# Patient Record
Sex: FEMALE | Race: WHITE | NOT HISPANIC OR LATINO | URBAN - METROPOLITAN AREA
[De-identification: names, ages, dates, MRNs, and addresses within clinical notes are randomized per-mention and may not be internally consistent; named-entity substitution may affect disease eponyms.]

---

## 2023-06-05 ENCOUNTER — TRANSCRIBE ORDERS (OUTPATIENT)
Dept: ADMINISTRATIVE | Facility: HOSPITAL | Age: 58
End: 2023-06-05
Payer: COMMERCIAL

## 2023-06-05 DIAGNOSIS — Z12.31 VISIT FOR SCREENING MAMMOGRAM: Primary | ICD-10-CM

## 2023-08-25 ENCOUNTER — HOSPITAL ENCOUNTER (OUTPATIENT)
Dept: MAMMOGRAPHY | Facility: HOSPITAL | Age: 58
Discharge: HOME | End: 2023-08-25
Payer: COMMERCIAL

## 2023-08-25 ENCOUNTER — APPOINTMENT (OUTPATIENT)
Dept: OTHER | Facility: HOSPITAL | Age: 58
End: 2023-08-25
Payer: COMMERCIAL

## 2023-08-25 DIAGNOSIS — Z12.31 VISIT FOR SCREENING MAMMOGRAM: ICD-10-CM

## 2023-08-25 PROCEDURE — 77063 BREAST TOMOSYNTHESIS BI: CPT

## 2023-08-25 PROCEDURE — 77067 SCR MAMMO BI INCL CAD: CPT

## 2023-09-29 ENCOUNTER — TRANSCRIBE ORDERS (OUTPATIENT)
Dept: MAMMOGRAPHY | Facility: HOSPITAL | Age: 58
End: 2023-09-29
Payer: COMMERCIAL

## 2023-09-29 ENCOUNTER — HOSPITAL ENCOUNTER (OUTPATIENT)
Dept: ULTRASOUND IMAGING | Facility: HOSPITAL | Age: 58
Discharge: HOME | End: 2023-09-29
Payer: COMMERCIAL

## 2023-09-29 ENCOUNTER — HOSPITAL ENCOUNTER (OUTPATIENT)
Dept: MAMMOGRAPHY | Facility: HOSPITAL | Age: 58
Discharge: HOME | End: 2023-09-29
Payer: COMMERCIAL

## 2023-09-29 DIAGNOSIS — R92.8 ABNORMAL MAMMOGRAM: ICD-10-CM

## 2023-09-29 PROCEDURE — 76642 ULTRASOUND BREAST LIMITED: CPT

## 2023-09-29 PROCEDURE — 77066 DX MAMMO INCL CAD BI: CPT

## 2023-09-29 PROCEDURE — G0279 TOMOSYNTHESIS, MAMMO: HCPCS

## 2023-11-16 ENCOUNTER — HOSPITAL ENCOUNTER (OUTPATIENT)
Dept: MAMMOGRAPHY | Facility: HOSPITAL | Age: 58
Discharge: HOME | End: 2023-11-16
Payer: COMMERCIAL

## 2023-11-16 DIAGNOSIS — R92.8 ABNORMAL MAMMOGRAM: ICD-10-CM

## 2023-11-16 PROCEDURE — 25010000002 LIDOCAINE 1 % SOLUTION: Performed by: RADIOLOGY

## 2023-11-16 PROCEDURE — C1819 TISSUE LOCALIZATION-EXCISION: HCPCS

## 2023-11-16 RX ORDER — LIDOCAINE HYDROCHLORIDE AND EPINEPHRINE 10; 10 MG/ML; UG/ML
10 INJECTION, SOLUTION INFILTRATION; PERINEURAL ONCE
Status: COMPLETED | OUTPATIENT
Start: 2023-11-16 | End: 2023-11-16

## 2023-11-16 RX ORDER — LIDOCAINE HYDROCHLORIDE 10 MG/ML
13 INJECTION, SOLUTION INFILTRATION; PERINEURAL ONCE
Status: COMPLETED | OUTPATIENT
Start: 2023-11-16 | End: 2023-11-16

## 2023-11-16 RX ADMIN — LIDOCAINE HYDROCHLORIDE,EPINEPHRINE BITARTRATE 10 ML: 10; .01 INJECTION, SOLUTION INFILTRATION; PERINEURAL at 13:45

## 2023-11-16 RX ADMIN — LIDOCAINE HYDROCHLORIDE,EPINEPHRINE BITARTRATE 10 ML: 10; .01 INJECTION, SOLUTION INFILTRATION; PERINEURAL at 14:27

## 2023-11-16 RX ADMIN — LIDOCAINE HYDROCHLORIDE 6 ML: 10 INJECTION, SOLUTION INFILTRATION; PERINEURAL at 13:45

## 2023-11-16 RX ADMIN — LIDOCAINE HYDROCHLORIDE 6 ML: 10 INJECTION, SOLUTION INFILTRATION; PERINEURAL at 14:31

## 2023-11-16 NOTE — PROGRESS NOTES
Alert and orientated. Denies discomfort, no active bleeding, steri-strips not visualized, gauze dressing intact bilaterally. Cold packs given. Verbalizes and demonstrates understanding of post-care instructions, written copy given.

## 2023-11-20 ENCOUNTER — TELEPHONE (OUTPATIENT)
Dept: MAMMOGRAPHY | Facility: HOSPITAL | Age: 58
End: 2023-11-20
Payer: COMMERCIAL

## 2023-11-20 LAB
CYTO UR: NORMAL
CYTO UR: NORMAL
LAB AP CASE REPORT: NORMAL
LAB AP CASE REPORT: NORMAL
LAB AP CLINICAL INFORMATION: NORMAL
LAB AP CLINICAL INFORMATION: NORMAL
LAB AP DIAGNOSIS COMMENT: NORMAL
LAB AP DIAGNOSIS COMMENT: NORMAL
PATH REPORT.FINAL DX SPEC: NORMAL
PATH REPORT.FINAL DX SPEC: NORMAL
PATH REPORT.GROSS SPEC: NORMAL
PATH REPORT.GROSS SPEC: NORMAL

## 2023-11-21 ENCOUNTER — TELEPHONE (OUTPATIENT)
Dept: MAMMOGRAPHY | Facility: HOSPITAL | Age: 58
End: 2023-11-21
Payer: COMMERCIAL

## 2023-11-21 NOTE — TELEPHONE ENCOUNTER
Patient notified of surgical consult appointment with Dr. Jorgensen on 12/7/23 @ 1500. Patient given office contact & location information. Told to bring photo ID, list of prescription & OTC medications, insurance information. May be accompanied by family member or friend for support. Encouraged pt to call back or contact surgeon's office with further questions. Patient verbalized understanding.

## 2023-12-19 ENCOUNTER — TRANSCRIBE ORDERS (OUTPATIENT)
Dept: ADMINISTRATIVE | Facility: HOSPITAL | Age: 58
End: 2023-12-19
Payer: COMMERCIAL

## 2023-12-19 DIAGNOSIS — R92.8 ABNORMAL MAMMOGRAM: Primary | ICD-10-CM

## 2023-12-29 ENCOUNTER — PRE-ADMISSION TESTING (OUTPATIENT)
Dept: PREADMISSION TESTING | Facility: HOSPITAL | Age: 58
End: 2023-12-29
Payer: COMMERCIAL

## 2023-12-29 LAB
ANION GAP SERPL CALCULATED.3IONS-SCNC: 9 MMOL/L (ref 5–15)
BUN SERPL-MCNC: 12 MG/DL (ref 6–20)
BUN/CREAT SERPL: 16.9 (ref 7–25)
CALCIUM SPEC-SCNC: 9.5 MG/DL (ref 8.6–10.5)
CHLORIDE SERPL-SCNC: 103 MMOL/L (ref 98–107)
CO2 SERPL-SCNC: 25 MMOL/L (ref 22–29)
CREAT SERPL-MCNC: 0.71 MG/DL (ref 0.57–1)
DEPRECATED RDW RBC AUTO: 41.2 FL (ref 37–54)
EGFRCR SERPLBLD CKD-EPI 2021: 98.1 ML/MIN/1.73
ERYTHROCYTE [DISTWIDTH] IN BLOOD BY AUTOMATED COUNT: 12.8 % (ref 12.3–15.4)
GLUCOSE SERPL-MCNC: 114 MG/DL (ref 65–99)
HCT VFR BLD AUTO: 41.1 % (ref 34–46.6)
HGB BLD-MCNC: 13.4 G/DL (ref 12–15.9)
MCH RBC QN AUTO: 28.5 PG (ref 26.6–33)
MCHC RBC AUTO-ENTMCNC: 32.6 G/DL (ref 31.5–35.7)
MCV RBC AUTO: 87.3 FL (ref 79–97)
PLATELET # BLD AUTO: 233 10*3/MM3 (ref 140–450)
PMV BLD AUTO: 10.5 FL (ref 6–12)
POTASSIUM SERPL-SCNC: 4.2 MMOL/L (ref 3.5–5.2)
RBC # BLD AUTO: 4.71 10*6/MM3 (ref 3.77–5.28)
SODIUM SERPL-SCNC: 137 MMOL/L (ref 136–145)
WBC NRBC COR # BLD AUTO: 5.77 10*3/MM3 (ref 3.4–10.8)

## 2023-12-29 PROCEDURE — 80048 BASIC METABOLIC PNL TOTAL CA: CPT

## 2023-12-29 PROCEDURE — 36415 COLL VENOUS BLD VENIPUNCTURE: CPT

## 2023-12-29 PROCEDURE — 93005 ELECTROCARDIOGRAM TRACING: CPT

## 2023-12-29 PROCEDURE — 85027 COMPLETE CBC AUTOMATED: CPT

## 2023-12-30 LAB
QT INTERVAL: 390 MS
QTC INTERVAL: 414 MS

## 2024-01-09 ENCOUNTER — LAB REQUISITION (OUTPATIENT)
Dept: LAB | Facility: HOSPITAL | Age: 59
End: 2024-01-09
Payer: COMMERCIAL

## 2024-01-09 ENCOUNTER — HOSPITAL ENCOUNTER (OUTPATIENT)
Dept: MAMMOGRAPHY | Facility: HOSPITAL | Age: 59
Discharge: HOME | End: 2024-01-09
Payer: COMMERCIAL

## 2024-01-09 DIAGNOSIS — R92.8 ABNORMAL MAMMOGRAM: ICD-10-CM

## 2024-01-09 DIAGNOSIS — R92.0 MAMMOGRAPHIC MICROCALCIFICATION FOUND ON DIAGNOSTIC IMAGING OF BREAST: ICD-10-CM

## 2024-01-09 DIAGNOSIS — K81.1 CHOLECYSTITIS, CHRONIC: Primary | ICD-10-CM

## 2024-01-09 PROCEDURE — C1819 TISSUE LOCALIZATION-EXCISION: HCPCS

## 2024-01-09 PROCEDURE — 76098 X-RAY EXAM SURGICAL SPECIMEN: CPT

## 2024-01-09 PROCEDURE — 25010000002 LIDOCAINE 1 % SOLUTION: Performed by: RADIOLOGY

## 2024-01-09 RX ORDER — TRAMADOL HYDROCHLORIDE 50 MG/1
50 TABLET ORAL EVERY 6 HOURS PRN
Qty: 15 TABLET | Refills: 0 | Status: SHIPPED | OUTPATIENT
Start: 2024-01-09

## 2024-01-09 RX ORDER — LIDOCAINE HYDROCHLORIDE 10 MG/ML
5 INJECTION, SOLUTION INFILTRATION; PERINEURAL ONCE
Status: COMPLETED | OUTPATIENT
Start: 2024-01-09 | End: 2024-01-09

## 2024-01-09 RX ADMIN — Medication 2 ML: at 08:10

## 2024-01-11 LAB — REF LAB TEST METHOD: NORMAL

## 2024-02-14 ENCOUNTER — TRANSCRIBE ORDERS (OUTPATIENT)
Dept: ADMINISTRATIVE | Facility: HOSPITAL | Age: 59
End: 2024-02-14
Payer: COMMERCIAL

## 2024-02-14 DIAGNOSIS — R92.8 ABNORMAL MAMMOGRAM: Primary | ICD-10-CM

## 2024-07-15 ENCOUNTER — HOSPITAL ENCOUNTER (OUTPATIENT)
Dept: MAMMOGRAPHY | Facility: HOSPITAL | Age: 59
Discharge: HOME | End: 2024-07-15
Admitting: OBSTETRICS & GYNECOLOGY
Payer: COMMERCIAL

## 2024-07-15 DIAGNOSIS — R92.8 ABNORMAL MAMMOGRAM: ICD-10-CM

## 2024-07-15 PROCEDURE — 77066 DX MAMMO INCL CAD BI: CPT

## 2024-07-15 PROCEDURE — G0279 TOMOSYNTHESIS, MAMMO: HCPCS

## 2024-07-16 ENCOUNTER — TRANSCRIBE ORDERS (OUTPATIENT)
Dept: ADMINISTRATIVE | Facility: HOSPITAL | Age: 59
End: 2024-07-16
Payer: COMMERCIAL

## 2024-07-16 DIAGNOSIS — R92.8 ABNORMAL MAMMOGRAM: Primary | ICD-10-CM

## 2024-07-29 ENCOUNTER — HOSPITAL ENCOUNTER (OUTPATIENT)
Facility: HOSPITAL | Age: 59
Discharge: HOME | End: 2024-07-29
Payer: COMMERCIAL

## 2024-07-29 DIAGNOSIS — R92.8 ABNORMAL MAMMOGRAM: ICD-10-CM

## 2024-07-29 PROCEDURE — 88360 TUMOR IMMUNOHISTOCHEM/MANUAL: CPT | Performed by: RADIOLOGY

## 2024-07-29 PROCEDURE — 88341 IMHCHEM/IMCYTCHM EA ADD ANTB: CPT | Performed by: RADIOLOGY

## 2024-07-29 PROCEDURE — 88305 TISSUE EXAM BY PATHOLOGIST: CPT | Performed by: RADIOLOGY

## 2024-07-29 PROCEDURE — 25010000002 LIDOCAINE 1 % SOLUTION: Performed by: RADIOLOGY

## 2024-07-29 PROCEDURE — C1819 TISSUE LOCALIZATION-EXCISION: HCPCS

## 2024-07-29 RX ORDER — LIDOCAINE HYDROCHLORIDE 10 MG/ML
10 INJECTION, SOLUTION INFILTRATION; PERINEURAL ONCE
Status: COMPLETED | OUTPATIENT
Start: 2024-07-29 | End: 2024-07-29

## 2024-07-29 RX ORDER — LIDOCAINE HYDROCHLORIDE AND EPINEPHRINE 10; 10 MG/ML; UG/ML
10 INJECTION, SOLUTION INFILTRATION; PERINEURAL ONCE
Status: COMPLETED | OUTPATIENT
Start: 2024-07-29 | End: 2024-07-29

## 2024-07-29 RX ADMIN — LIDOCAINE HYDROCHLORIDE,EPINEPHRINE BITARTRATE 10 ML: 10; .01 INJECTION, SOLUTION INFILTRATION; PERINEURAL at 09:29

## 2024-07-29 RX ADMIN — LIDOCAINE HYDROCHLORIDE,EPINEPHRINE BITARTRATE 10 ML: 10; .01 INJECTION, SOLUTION INFILTRATION; PERINEURAL at 08:56

## 2024-07-29 RX ADMIN — LIDOCAINE HYDROCHLORIDE 6 ML: 10 INJECTION, SOLUTION INFILTRATION; PERINEURAL at 08:56

## 2024-07-29 RX ADMIN — LIDOCAINE HYDROCHLORIDE 9 ML: 10 INJECTION, SOLUTION INFILTRATION; PERINEURAL at 09:28

## 2024-07-29 NOTE — PROGRESS NOTES
Alert and oriented. Denies discomfort, no active bleeding, steri-strips not visualized, gauze dressing intact both sites.  Cold packs given.  Verbalizes and demonstrates understanding of post-care instructions, written copy given. Questions answered, support given.

## 2024-07-31 LAB
CYTO UR: NORMAL
LAB AP CASE REPORT: NORMAL
LAB AP CLINICAL INFORMATION: NORMAL
PATH REPORT.FINAL DX SPEC: NORMAL
PATH REPORT.GROSS SPEC: NORMAL

## 2024-08-02 ENCOUNTER — TELEPHONE (OUTPATIENT)
Dept: MAMMOGRAPHY | Facility: HOSPITAL | Age: 59
End: 2024-08-02
Payer: COMMERCIAL

## 2024-08-02 LAB
CYTO UR: NORMAL
LAB AP CASE REPORT: NORMAL
LAB AP CLINICAL INFORMATION: NORMAL
LAB AP DIAGNOSIS COMMENT: NORMAL
LAB AP SPECIAL STAINS: NORMAL
PATH REPORT.FINAL DX SPEC: NORMAL
PATH REPORT.GROSS SPEC: NORMAL

## 2024-08-02 NOTE — TELEPHONE ENCOUNTER
Referring providers office could not be notified of pathology results due to being closed and unable to leave a message.     Patient notified of pathology results and recommendation. She is aware she will be notified by [facility] schedulers with an appointment for a left stereotactic breast biopsy. Verbalizes understanding. Denies discomfort. Denies signs and symptoms of infection.     Patient established with Dr GISELLE Jorgensen. Patient notified of surgical consult appointment on 8/15/24 @ 0915 with arrival time of 0945  [facility] [address] [facility]. Patient given office contact & location information.Told to bring photo ID, list of prescription & OTC medications, insurance information. May be accompanied by family member or friend for support. Patient verbalized understanding.     Reviewed what would be discussed at surgical consult visit, including detailed explanation of pathology report & imaging reports; treatment options & pros/cons, availability of breast nurse navigator. Patient encouraged to contact  [facility] nurses with questions or concerns. Breast cancer information packet offered and accepted. Patient verbalized understanding. Patient information sent to breast cancer nurse navigators for evaluation.

## 2024-08-05 ENCOUNTER — TRANSCRIBE ORDERS (OUTPATIENT)
Dept: ADMINISTRATIVE | Facility: HOSPITAL | Age: 59
End: 2024-08-05
Payer: COMMERCIAL

## 2024-08-05 DIAGNOSIS — R92.8 ABNORMAL MAMMOGRAM: Primary | ICD-10-CM

## 2024-08-08 ENCOUNTER — HOSPITAL ENCOUNTER (OUTPATIENT)
Facility: HOSPITAL | Age: 59
Discharge: HOME | End: 2024-08-08
Payer: COMMERCIAL

## 2024-08-08 DIAGNOSIS — R92.8 ABNORMAL MAMMOGRAM: ICD-10-CM

## 2024-08-08 PROCEDURE — 25010000002 LIDOCAINE 1 % SOLUTION: Performed by: RADIOLOGY

## 2024-08-08 PROCEDURE — 88305 TISSUE EXAM BY PATHOLOGIST: CPT | Performed by: RADIOLOGY

## 2024-08-08 PROCEDURE — C1819 TISSUE LOCALIZATION-EXCISION: HCPCS

## 2024-08-08 RX ORDER — LIDOCAINE HYDROCHLORIDE 10 MG/ML
10 INJECTION, SOLUTION INFILTRATION; PERINEURAL ONCE
Status: COMPLETED | OUTPATIENT
Start: 2024-08-08 | End: 2024-08-08

## 2024-08-08 RX ORDER — LIDOCAINE HYDROCHLORIDE AND EPINEPHRINE 10; 10 MG/ML; UG/ML
10 INJECTION, SOLUTION INFILTRATION; PERINEURAL ONCE
Status: COMPLETED | OUTPATIENT
Start: 2024-08-08 | End: 2024-08-08

## 2024-08-08 RX ADMIN — LIDOCAINE HYDROCHLORIDE,EPINEPHRINE BITARTRATE 10 ML: 10; .01 INJECTION, SOLUTION INFILTRATION; PERINEURAL at 08:34

## 2024-08-08 RX ADMIN — LIDOCAINE HYDROCHLORIDE 15 ML: 10 INJECTION, SOLUTION INFILTRATION; PERINEURAL at 08:50

## 2024-08-09 ENCOUNTER — TELEPHONE (OUTPATIENT)
Dept: MAMMOGRAPHY | Facility: HOSPITAL | Age: 59
End: 2024-08-09
Payer: COMMERCIAL

## 2024-08-09 LAB
CYTO UR: NORMAL
LAB AP CASE REPORT: NORMAL
LAB AP CLINICAL INFORMATION: NORMAL
LAB AP DIAGNOSIS COMMENT: NORMAL
PATH REPORT.FINAL DX SPEC: NORMAL
PATH REPORT.GROSS SPEC: NORMAL

## 2024-08-09 NOTE — TELEPHONE ENCOUNTER
Patient notified of pathology results and recommendations. Verbalizes understanding. Denies discomfort. Denies signs and symptoms of infection.     Patient is aware of previously scheduled surgical consult appointment with Dr. Jorgensen on 8/15/24 @ 2692 [address] [place] [address].

## 2024-08-15 ENCOUNTER — PATIENT OUTREACH (OUTPATIENT)
Dept: ONCOLOGY | Facility: CLINIC | Age: 59
End: 2024-08-15
Payer: COMMERCIAL

## 2024-08-15 DIAGNOSIS — Z17.0 MALIGNANT NEOPLASM OF LEFT BREAST IN FEMALE, ESTROGEN RECEPTOR POSITIVE, UNSPECIFIED SITE OF BREAST: Primary | ICD-10-CM

## 2024-08-15 DIAGNOSIS — C50.912 MALIGNANT NEOPLASM OF LEFT BREAST IN FEMALE, ESTROGEN RECEPTOR POSITIVE, UNSPECIFIED SITE OF BREAST: Primary | ICD-10-CM

## 2024-08-15 NOTE — PROGRESS NOTES
I saw patient with Dr. Germain's and patient's . Dr Germain's reviewed pathology report and surgical/treatment options - the patient will have a breast MRI and then she has opted for Bilateral Mastectomies with Immediate Reconstruction - she asked to see Dr Colton Martin for Reconstruction- Dr. Germain's office will make this referral - I took notes for patient and reviewed educational and supportive materials.

## 2024-08-19 ENCOUNTER — PATIENT OUTREACH (OUTPATIENT)
Dept: ONCOLOGY | Facility: CLINIC | Age: 59
End: 2024-08-19
Payer: COMMERCIAL

## 2024-08-19 NOTE — SIGNIFICANT NOTE
Patient called to ask about appointment with Dr Colton Martin- Dr Germain's office had not yet confirmed an appointment - I called [facility] - Patient has appointment to see Dr Martin 8/21/2024 @ 6220. Directions - address and phone number provided to patient - Crystal at [facility] is aware and will send records.

## 2024-08-20 ENCOUNTER — HOSPITAL ENCOUNTER (OUTPATIENT)
Dept: MRI IMAGING | Facility: HOSPITAL | Age: 59
Discharge: HOME | End: 2024-08-20
Admitting: STUDENT IN AN ORGANIZED HEALTH CARE EDUCATION/TRAINING PROGRAM
Payer: COMMERCIAL

## 2024-08-20 DIAGNOSIS — C50.412 MALIGNANT NEOPLASM OF UPPER-OUTER QUADRANT OF LEFT FEMALE BREAST, UNSPECIFIED ESTROGEN RECEPTOR STATUS: ICD-10-CM

## 2024-08-20 PROCEDURE — 77049 MRI BREAST C-+ W/CAD BI: CPT | Performed by: RADIOLOGY

## 2024-08-20 PROCEDURE — A9577 INJ MULTIHANCE: HCPCS | Performed by: STUDENT IN AN ORGANIZED HEALTH CARE EDUCATION/TRAINING PROGRAM

## 2024-08-20 PROCEDURE — 0 GADOBENATE DIMEGLUMINE 529 MG/ML SOLUTION: Performed by: STUDENT IN AN ORGANIZED HEALTH CARE EDUCATION/TRAINING PROGRAM

## 2024-08-20 PROCEDURE — 77049 MRI BREAST C-+ W/CAD BI: CPT

## 2024-08-20 RX ADMIN — GADOBENATE DIMEGLUMINE 17 ML: 529 INJECTION, SOLUTION INTRAVENOUS at 11:22

## 2024-08-21 ENCOUNTER — TELEPHONE (OUTPATIENT)
Dept: MRI IMAGING | Facility: HOSPITAL | Age: 59
End: 2024-08-21
Payer: COMMERCIAL

## 2024-08-21 NOTE — TELEPHONE ENCOUNTER
Just talked with pt as she has had 3 MRI biopsies recommended from her Breast MRI. Pt is planning double mastectomies with reconstruction.  I left a message for Wanda Cerna at Dr. Jorgensen office to confirm the biopsies will not be needed. Msg sent to Nurse navigators to let them know also.

## 2024-08-22 ENCOUNTER — TRANSCRIBE ORDERS (OUTPATIENT)
Dept: ADMINISTRATIVE | Facility: HOSPITAL | Age: 59
End: 2024-08-22
Payer: COMMERCIAL

## 2024-08-22 DIAGNOSIS — C50.412 MALIGNANT NEOPLASM OF UPPER-OUTER QUADRANT OF LEFT FEMALE BREAST, UNSPECIFIED ESTROGEN RECEPTOR STATUS: Primary | ICD-10-CM

## 2024-08-24 ENCOUNTER — TELEPHONE (OUTPATIENT)
Dept: MRI IMAGING | Facility: HOSPITAL | Age: 59
End: 2024-08-24
Payer: COMMERCIAL

## 2024-08-24 NOTE — TELEPHONE ENCOUNTER
I called pt to schedule her MRI guided Breast biopsy recommended by Dr. Jorgensen. She would be scheduled for Wednesday Aug 28th at 8:00 at [facility]. She did not understand why this was being done. She said no one from [facility] had called explaining why she would need this since she is scheduled for Bilateral Mastectomies. I told her I would send a note to the Nurse Navigator so they could help her contact the surgeons office , or provide an explanation for her.  I have asked that they let us know if she is going to proceed     8/24 ivone

## 2024-08-27 ENCOUNTER — TELEPHONE (OUTPATIENT)
Dept: MRI IMAGING | Facility: HOSPITAL | Age: 59
End: 2024-08-27
Payer: COMMERCIAL

## 2024-08-27 NOTE — TELEPHONE ENCOUNTER
After multiple phone calls patient has called back to schedule the recommended Right sided MRI Breast Biopsy. Scheduled for 8/28/2024 at 8:00 with 7:50 arrival at [facility]. No BT. Procedure described in detail and encouraged to call with any further questions. Patient explains she is scheduled for mastectomies on 9/6/2024.

## 2024-08-28 ENCOUNTER — HOSPITAL ENCOUNTER (OUTPATIENT)
Dept: MAMMOGRAPHY | Facility: HOSPITAL | Age: 59
Discharge: HOME | End: 2024-08-28
Payer: COMMERCIAL

## 2024-08-28 ENCOUNTER — HOSPITAL ENCOUNTER (OUTPATIENT)
Dept: MRI IMAGING | Facility: HOSPITAL | Age: 59
Discharge: HOME | End: 2024-08-28
Payer: COMMERCIAL

## 2024-08-28 DIAGNOSIS — C50.412 MALIGNANT NEOPLASM OF UPPER-OUTER QUADRANT OF LEFT FEMALE BREAST, UNSPECIFIED ESTROGEN RECEPTOR STATUS: ICD-10-CM

## 2024-08-28 PROCEDURE — A9577 INJ MULTIHANCE: HCPCS | Performed by: STUDENT IN AN ORGANIZED HEALTH CARE EDUCATION/TRAINING PROGRAM

## 2024-08-28 PROCEDURE — 0 GADOBENATE DIMEGLUMINE 529 MG/ML SOLUTION: Performed by: STUDENT IN AN ORGANIZED HEALTH CARE EDUCATION/TRAINING PROGRAM

## 2024-08-28 PROCEDURE — C1894 INTRO/SHEATH, NON-LASER: HCPCS

## 2024-08-28 PROCEDURE — 88305 TISSUE EXAM BY PATHOLOGIST: CPT | Performed by: RADIOLOGY

## 2024-08-28 PROCEDURE — 25010000002 LIDOCAINE 1 % SOLUTION: Performed by: RADIOLOGY

## 2024-08-28 PROCEDURE — A4648 IMPLANTABLE TISSUE MARKER: HCPCS

## 2024-08-28 RX ORDER — LIDOCAINE HYDROCHLORIDE 10 MG/ML
3 INJECTION, SOLUTION INFILTRATION; PERINEURAL
Status: COMPLETED | OUTPATIENT
Start: 2024-08-28 | End: 2024-08-28

## 2024-08-28 RX ORDER — LIDOCAINE HYDROCHLORIDE AND EPINEPHRINE 10; 10 MG/ML; UG/ML
10 INJECTION, SOLUTION INFILTRATION; PERINEURAL
Status: COMPLETED | OUTPATIENT
Start: 2024-08-28 | End: 2024-08-28

## 2024-08-28 RX ADMIN — Medication 3 ML: at 09:20

## 2024-08-28 RX ADMIN — LIDOCAINE HYDROCHLORIDE,EPINEPHRINE BITARTRATE 10 ML: 10; .01 INJECTION, SOLUTION INFILTRATION; PERINEURAL at 09:20

## 2024-08-28 RX ADMIN — GADOBENATE DIMEGLUMINE 17 ML: 529 INJECTION, SOLUTION INTRAVENOUS at 09:20

## 2024-08-29 ENCOUNTER — PRE-ADMISSION TESTING (OUTPATIENT)
Dept: PREADMISSION TESTING | Facility: HOSPITAL | Age: 59
End: 2024-08-29
Payer: COMMERCIAL

## 2024-08-29 VITALS — WEIGHT: 185.41 LBS | HEIGHT: 63 IN | BODY MASS INDEX: 32.85 KG/M2

## 2024-08-29 LAB
ALBUMIN SERPL-MCNC: 4.3 G/DL (ref 3.5–5.2)
ALBUMIN/GLOB SERPL: 1.5 G/DL
ALP SERPL-CCNC: 84 U/L (ref 39–117)
ALT SERPL W P-5'-P-CCNC: 34 U/L (ref 1–33)
ANION GAP SERPL CALCULATED.3IONS-SCNC: 10 MMOL/L (ref 5–15)
AST SERPL-CCNC: 25 U/L (ref 1–32)
BILIRUB SERPL-MCNC: 0.5 MG/DL (ref 0–1.2)
BUN SERPL-MCNC: 12 MG/DL (ref 6–20)
BUN/CREAT SERPL: 18.5 (ref 7–25)
CALCIUM SPEC-SCNC: 9.4 MG/DL (ref 8.6–10.5)
CHLORIDE SERPL-SCNC: 104 MMOL/L (ref 98–107)
CO2 SERPL-SCNC: 25 MMOL/L (ref 22–29)
CREAT SERPL-MCNC: 0.65 MG/DL (ref 0.57–1)
CYTO UR: NORMAL
DEPRECATED RDW RBC AUTO: 40 FL (ref 37–54)
EGFRCR SERPLBLD CKD-EPI 2021: 101.6 ML/MIN/1.73
ERYTHROCYTE [DISTWIDTH] IN BLOOD BY AUTOMATED COUNT: 12.4 % (ref 12.3–15.4)
GLOBULIN UR ELPH-MCNC: 2.9 GM/DL
GLUCOSE SERPL-MCNC: 114 MG/DL (ref 65–99)
HBA1C MFR BLD: 5.2 % (ref 4.8–5.6)
HCT VFR BLD AUTO: 41.8 % (ref 34–46.6)
HGB BLD-MCNC: 13.6 G/DL (ref 12–15.9)
LAB AP CASE REPORT: NORMAL
LAB AP CLINICAL INFORMATION: NORMAL
LAB AP DIAGNOSIS COMMENT: NORMAL
MCH RBC QN AUTO: 28.6 PG (ref 26.6–33)
MCHC RBC AUTO-ENTMCNC: 32.5 G/DL (ref 31.5–35.7)
MCV RBC AUTO: 87.8 FL (ref 79–97)
PATH REPORT.FINAL DX SPEC: NORMAL
PATH REPORT.GROSS SPEC: NORMAL
PLATELET # BLD AUTO: 228 10*3/MM3 (ref 140–450)
PMV BLD AUTO: 11 FL (ref 6–12)
POTASSIUM SERPL-SCNC: 4.3 MMOL/L (ref 3.5–5.2)
PROT SERPL-MCNC: 7.2 G/DL (ref 6–8.5)
QT INTERVAL: 390 MS
QTC INTERVAL: 412 MS
RBC # BLD AUTO: 4.76 10*6/MM3 (ref 3.77–5.28)
SODIUM SERPL-SCNC: 139 MMOL/L (ref 136–145)
WBC NRBC COR # BLD AUTO: 5.03 10*3/MM3 (ref 3.4–10.8)

## 2024-08-29 PROCEDURE — 36415 COLL VENOUS BLD VENIPUNCTURE: CPT

## 2024-08-29 PROCEDURE — 80053 COMPREHEN METABOLIC PANEL: CPT

## 2024-08-29 PROCEDURE — 83036 HEMOGLOBIN GLYCOSYLATED A1C: CPT

## 2024-08-29 PROCEDURE — 85027 COMPLETE CBC AUTOMATED: CPT

## 2024-08-29 PROCEDURE — 93005 ELECTROCARDIOGRAM TRACING: CPT

## 2024-08-29 RX ORDER — LISINOPRIL 10 MG/1
10 TABLET ORAL DAILY
COMMUNITY

## 2024-08-29 NOTE — PAT
An arrival time for procedure was not provided during PAT visit. If patient had any questions or concerns about their arrival time, they were instructed to contact their surgeon/physician.  Additionally, if the patient referred to an arrival time that was acquired from their my chart account, patient was encouraged to verify that time with their surgeon/physician. Arrival times are NOT provided in Pre Admission Testing Department.    Patient viewed general PAT education video as instructed in their preoperative information received from their surgeon.  Patient stated the general PAT education video was viewed in its entirety and survey completed.  Copies of PAT general education handouts (Incentive Spirometry, Meds to Beds Program, Patient Belongings, Pre-op skin preparation instructions, Blood Glucose testing, Visitor policy, Surgery FAQ, Code H) distributed to patient if not printed. Education related to the PAT pass and skin preparation for surgery (if applicable) completed in PAT as a reinforcement to PAT education video. Patient instructed to return PAT pass provided today as well as completed skin preparation sheet (if applicable) on the day of procedure.     Additionally if patient had not viewed video yet but intended to view it at home or in our waiting area, then referred them to the handout with QR code/link provided during PAT visit.  Encouraged patient/family to read PAT general education handouts thoroughly and notify PAT staff with any questions or concerns. Patient verbalized understanding of all information and priority content.    Patient denies any current skin issues.     Patient to apply Chlorhexadine wipes  to surgical area (as instructed) the night before procedure and the AM of procedure. Wipes provided.    Patient instructed to drink 20 ounces of Gatorade or Gatorlyte (if diabetic) and it needs to be completed 1 hour (for Main OR patients) or 2 hours (scheduled  [facility]) before given arrival time for procedure (NO RED Gatorade and NO Gatorade Zero).    Patient verbalized understanding.    Per Anesthesia Request, patient instructed not to take their ACE/ARB medications on the AM of surgery.

## 2024-08-30 ENCOUNTER — TELEPHONE (OUTPATIENT)
Dept: MAMMOGRAPHY | Facility: HOSPITAL | Age: 59
End: 2024-08-30
Payer: COMMERCIAL

## 2024-08-30 NOTE — TELEPHONE ENCOUNTER
Patient notified of pathology results and recommendation. Verbalizes understanding. Denies discomfort.. Denies signs and symptoms of infection.     Patient established with Dr GISELLE Jorgensen. Patient stated she is scheduled for bilateral mastectomies 9/6/24. Patient encouraged to contact [facility] nurses or breast cancer nurse navigators with questions or concerns.

## 2024-09-06 ENCOUNTER — ANESTHESIA EVENT (OUTPATIENT)
Dept: PERIOP | Facility: HOSPITAL | Age: 59
End: 2024-09-06
Payer: COMMERCIAL

## 2024-09-06 ENCOUNTER — ANESTHESIA EVENT CONVERTED (OUTPATIENT)
Dept: ANESTHESIOLOGY | Facility: HOSPITAL | Age: 59
End: 2024-09-06
Payer: COMMERCIAL

## 2024-09-06 ENCOUNTER — ANESTHESIA (OUTPATIENT)
Dept: PERIOP | Facility: HOSPITAL | Age: 59
End: 2024-09-06
Payer: COMMERCIAL

## 2024-09-06 ENCOUNTER — HOSPITAL ENCOUNTER (OUTPATIENT)
Facility: HOSPITAL | Age: 59
Discharge: HOME | End: 2024-09-07
Attending: STUDENT IN AN ORGANIZED HEALTH CARE EDUCATION/TRAINING PROGRAM | Admitting: PLASTIC SURGERY
Payer: COMMERCIAL

## 2024-09-06 ENCOUNTER — HOSPITAL ENCOUNTER (OUTPATIENT)
Dept: NUCLEAR MEDICINE | Facility: HOSPITAL | Age: 59
Discharge: HOME | End: 2024-09-06
Payer: COMMERCIAL

## 2024-09-06 DIAGNOSIS — C50.919 BREAST CANCER: ICD-10-CM

## 2024-09-06 DIAGNOSIS — C50.412 MALIGNANT NEOPLASM OF UPPER-OUTER QUADRANT OF LEFT FEMALE BREAST, UNSPECIFIED ESTROGEN RECEPTOR STATUS: ICD-10-CM

## 2024-09-06 PROCEDURE — 25010000002 DROPERIDOL PER 5 MG: Performed by: ANESTHESIOLOGY

## 2024-09-06 PROCEDURE — 0 TECHNETIUM FILTERED SULFUR COLLOID: Performed by: STUDENT IN AN ORGANIZED HEALTH CARE EDUCATION/TRAINING PROGRAM

## 2024-09-06 PROCEDURE — 25010000002 CEFAZOLIN PER 500 MG: Performed by: STUDENT IN AN ORGANIZED HEALTH CARE EDUCATION/TRAINING PROGRAM

## 2024-09-06 PROCEDURE — 88307 TISSUE EXAM BY PATHOLOGIST: CPT | Performed by: STUDENT IN AN ORGANIZED HEALTH CARE EDUCATION/TRAINING PROGRAM

## 2024-09-06 PROCEDURE — 25810000003 LACTATED RINGERS PER 1000 ML: Performed by: ANESTHESIOLOGY

## 2024-09-06 PROCEDURE — 25010000002 HYDROMORPHONE PER 4 MG: Performed by: NURSE ANESTHETIST, CERTIFIED REGISTERED

## 2024-09-06 PROCEDURE — 25010000002 DEXAMETHASONE PER 1 MG: Performed by: NURSE ANESTHETIST, CERTIFIED REGISTERED

## 2024-09-06 PROCEDURE — 25010000002 FENTANYL CITRATE (PF) 100 MCG/2ML SOLUTION: Performed by: NURSE ANESTHETIST, CERTIFIED REGISTERED

## 2024-09-06 PROCEDURE — 25010000002 FENTANYL CITRATE (PF) 50 MCG/ML SOLUTION

## 2024-09-06 PROCEDURE — 25010000002 FENTANYL CITRATE (PF) 50 MCG/ML SOLUTION: Performed by: NURSE ANESTHETIST, CERTIFIED REGISTERED

## 2024-09-06 PROCEDURE — 25010000002 DEXAMETHASONE SODIUM PHOSPHATE 10 MG/ML SOLUTION: Performed by: NURSE ANESTHETIST, CERTIFIED REGISTERED

## 2024-09-06 PROCEDURE — 25010000002 GENTAMICIN PER 80 MG: Performed by: PLASTIC SURGERY

## 2024-09-06 PROCEDURE — 25010000002 DROPERIDOL PER 5 MG

## 2024-09-06 PROCEDURE — 25010000002 CEFAZOLIN PER 500 MG: Performed by: NURSE ANESTHETIST, CERTIFIED REGISTERED

## 2024-09-06 PROCEDURE — 25010000002 ONDANSETRON PER 1 MG: Performed by: NURSE ANESTHETIST, CERTIFIED REGISTERED

## 2024-09-06 PROCEDURE — 25010000002 SUGAMMADEX 200 MG/2ML SOLUTION: Performed by: NURSE ANESTHETIST, CERTIFIED REGISTERED

## 2024-09-06 PROCEDURE — 25010000002 PROPOFOL 10 MG/ML EMULSION: Performed by: NURSE ANESTHETIST, CERTIFIED REGISTERED

## 2024-09-06 PROCEDURE — 25810000003 SODIUM CHLORIDE 0.9 % SOLUTION: Performed by: PLASTIC SURGERY

## 2024-09-06 PROCEDURE — 25010000002 CEFAZOLIN PER 500 MG: Performed by: PLASTIC SURGERY

## 2024-09-06 PROCEDURE — C1789 PROSTHESIS, BREAST, IMP: HCPCS | Performed by: STUDENT IN AN ORGANIZED HEALTH CARE EDUCATION/TRAINING PROGRAM

## 2024-09-06 PROCEDURE — 25010000002 BUPIVACAINE (PF) 0.25 % SOLUTION: Performed by: NURSE ANESTHETIST, CERTIFIED REGISTERED

## 2024-09-06 PROCEDURE — G0378 HOSPITAL OBSERVATION PER HR: HCPCS

## 2024-09-06 PROCEDURE — 25010000002 ONDANSETRON PER 1 MG: Performed by: PLASTIC SURGERY

## 2024-09-06 PROCEDURE — A9541 TC99M SULFUR COLLOID: HCPCS | Performed by: STUDENT IN AN ORGANIZED HEALTH CARE EDUCATION/TRAINING PROGRAM

## 2024-09-06 PROCEDURE — 38792 RA TRACER ID OF SENTINL NODE: CPT

## 2024-09-06 DEVICE — LIGACLIP MCA MULTIPLE CLIP APPLIERS, 20 MEDIUM CLIPS
Type: IMPLANTABLE DEVICE | Site: BREAST | Status: FUNCTIONAL
Brand: LIGACLIP

## 2024-09-06 DEVICE — ALLOGRFT DERM CORTIVA SILHOUETTE 16X20CM: Type: IMPLANTABLE DEVICE | Site: BREAST | Status: FUNCTIONAL

## 2024-09-06 DEVICE — EXPNDR TISS BRST F/HT XPROJ STL 133S FX/T 450CC: Type: IMPLANTABLE DEVICE | Site: BREAST | Status: FUNCTIONAL

## 2024-09-06 RX ORDER — FAMOTIDINE 10 MG/ML
20 INJECTION, SOLUTION INTRAVENOUS ONCE
Status: CANCELLED | OUTPATIENT
Start: 2024-09-06 | End: 2024-09-06

## 2024-09-06 RX ORDER — FENTANYL CITRATE 50 UG/ML
50 INJECTION, SOLUTION INTRAMUSCULAR; INTRAVENOUS
Status: DISCONTINUED | OUTPATIENT
Start: 2024-09-06 | End: 2024-09-06 | Stop reason: HOSPADM

## 2024-09-06 RX ORDER — DEXAMETHASONE SODIUM PHOSPHATE 4 MG/ML
INJECTION, SOLUTION INTRA-ARTICULAR; INTRALESIONAL; INTRAMUSCULAR; INTRAVENOUS; SOFT TISSUE AS NEEDED
Status: DISCONTINUED | OUTPATIENT
Start: 2024-09-06 | End: 2024-09-06 | Stop reason: SURG

## 2024-09-06 RX ORDER — SODIUM CHLORIDE 0.9 % (FLUSH) 0.9 %
10 SYRINGE (ML) INJECTION AS NEEDED
Status: DISCONTINUED | OUTPATIENT
Start: 2024-09-06 | End: 2024-09-06 | Stop reason: HOSPADM

## 2024-09-06 RX ORDER — DROPERIDOL 2.5 MG/ML
INJECTION, SOLUTION INTRAMUSCULAR; INTRAVENOUS
Status: COMPLETED
Start: 2024-09-06 | End: 2024-09-06

## 2024-09-06 RX ORDER — ONDANSETRON 2 MG/ML
4 INJECTION INTRAMUSCULAR; INTRAVENOUS EVERY 6 HOURS PRN
Status: DISCONTINUED | OUTPATIENT
Start: 2024-09-06 | End: 2024-09-07 | Stop reason: HOSPADM

## 2024-09-06 RX ORDER — PROPOFOL 10 MG/ML
VIAL (ML) INTRAVENOUS AS NEEDED
Status: DISCONTINUED | OUTPATIENT
Start: 2024-09-06 | End: 2024-09-06 | Stop reason: SURG

## 2024-09-06 RX ORDER — FENTANYL CITRATE 50 UG/ML
INJECTION, SOLUTION INTRAMUSCULAR; INTRAVENOUS AS NEEDED
Status: DISCONTINUED | OUTPATIENT
Start: 2024-09-06 | End: 2024-09-06 | Stop reason: SURG

## 2024-09-06 RX ORDER — SCOLOPAMINE TRANSDERMAL SYSTEM 1 MG/1
1 PATCH, EXTENDED RELEASE TRANSDERMAL ONCE
Status: DISCONTINUED | OUTPATIENT
Start: 2024-09-06 | End: 2024-09-06

## 2024-09-06 RX ORDER — TRANEXAMIC ACID 10 MG/ML
1000 INJECTION, SOLUTION INTRAVENOUS ONCE
Status: COMPLETED | OUTPATIENT
Start: 2024-09-06 | End: 2024-09-06

## 2024-09-06 RX ORDER — HYDROMORPHONE HYDROCHLORIDE 2 MG/ML
INJECTION, SOLUTION INTRAMUSCULAR; INTRAVENOUS; SUBCUTANEOUS AS NEEDED
Status: DISCONTINUED | OUTPATIENT
Start: 2024-09-06 | End: 2024-09-06 | Stop reason: SURG

## 2024-09-06 RX ORDER — LIDOCAINE HYDROCHLORIDE 10 MG/ML
INJECTION, SOLUTION EPIDURAL; INFILTRATION; INTRACAUDAL; PERINEURAL AS NEEDED
Status: DISCONTINUED | OUTPATIENT
Start: 2024-09-06 | End: 2024-09-06 | Stop reason: SURG

## 2024-09-06 RX ORDER — CEFAZOLIN SODIUM 1 G/3ML
INJECTION, POWDER, FOR SOLUTION INTRAMUSCULAR; INTRAVENOUS AS NEEDED
Status: DISCONTINUED | OUTPATIENT
Start: 2024-09-06 | End: 2024-09-06 | Stop reason: SURG

## 2024-09-06 RX ORDER — ONDANSETRON 2 MG/ML
4 INJECTION INTRAMUSCULAR; INTRAVENOUS ONCE AS NEEDED
Status: DISCONTINUED | OUTPATIENT
Start: 2024-09-06 | End: 2024-09-06 | Stop reason: HOSPADM

## 2024-09-06 RX ORDER — LIDOCAINE HYDROCHLORIDE 10 MG/ML
0.5 INJECTION, SOLUTION EPIDURAL; INFILTRATION; INTRACAUDAL; PERINEURAL ONCE AS NEEDED
Status: COMPLETED | OUTPATIENT
Start: 2024-09-06 | End: 2024-09-06

## 2024-09-06 RX ORDER — DEXAMETHASONE SODIUM PHOSPHATE 10 MG/ML
INJECTION, SOLUTION INTRAMUSCULAR; INTRAVENOUS
Status: COMPLETED | OUTPATIENT
Start: 2024-09-06 | End: 2024-09-06

## 2024-09-06 RX ORDER — ROCURONIUM BROMIDE 10 MG/ML
INJECTION, SOLUTION INTRAVENOUS AS NEEDED
Status: DISCONTINUED | OUTPATIENT
Start: 2024-09-06 | End: 2024-09-06 | Stop reason: SURG

## 2024-09-06 RX ORDER — MIDAZOLAM HYDROCHLORIDE 1 MG/ML
1 INJECTION INTRAMUSCULAR; INTRAVENOUS
Status: DISCONTINUED | OUTPATIENT
Start: 2024-09-06 | End: 2024-09-06 | Stop reason: HOSPADM

## 2024-09-06 RX ORDER — EPHEDRINE SULFATE 50 MG/ML
INJECTION INTRAVENOUS AS NEEDED
Status: DISCONTINUED | OUTPATIENT
Start: 2024-09-06 | End: 2024-09-06 | Stop reason: SURG

## 2024-09-06 RX ORDER — ONDANSETRON 2 MG/ML
INJECTION INTRAMUSCULAR; INTRAVENOUS AS NEEDED
Status: DISCONTINUED | OUTPATIENT
Start: 2024-09-06 | End: 2024-09-06 | Stop reason: SURG

## 2024-09-06 RX ORDER — SODIUM CHLORIDE 9 MG/ML
50 INJECTION, SOLUTION INTRAVENOUS CONTINUOUS
Status: DISCONTINUED | OUTPATIENT
Start: 2024-09-06 | End: 2024-09-07 | Stop reason: HOSPADM

## 2024-09-06 RX ORDER — HYDROMORPHONE HYDROCHLORIDE 1 MG/ML
0.25 INJECTION, SOLUTION INTRAMUSCULAR; INTRAVENOUS; SUBCUTANEOUS EVERY 4 HOURS PRN
Status: DISCONTINUED | OUTPATIENT
Start: 2024-09-06 | End: 2024-09-07 | Stop reason: HOSPADM

## 2024-09-06 RX ORDER — BUPIVACAINE HYDROCHLORIDE 2.5 MG/ML
INJECTION, SOLUTION EPIDURAL; INFILTRATION; INTRACAUDAL
Status: COMPLETED | OUTPATIENT
Start: 2024-09-06 | End: 2024-09-06

## 2024-09-06 RX ORDER — CYCLOBENZAPRINE HCL 10 MG
10 TABLET ORAL EVERY 8 HOURS PRN
Status: DISCONTINUED | OUTPATIENT
Start: 2024-09-06 | End: 2024-09-07 | Stop reason: HOSPADM

## 2024-09-06 RX ORDER — NALOXONE HCL 0.4 MG/ML
0.1 VIAL (ML) INJECTION
Status: DISCONTINUED | OUTPATIENT
Start: 2024-09-06 | End: 2024-09-07 | Stop reason: HOSPADM

## 2024-09-06 RX ORDER — SODIUM CHLORIDE, SODIUM LACTATE, POTASSIUM CHLORIDE, CALCIUM CHLORIDE 600; 310; 30; 20 MG/100ML; MG/100ML; MG/100ML; MG/100ML
9 INJECTION, SOLUTION INTRAVENOUS CONTINUOUS
Status: DISCONTINUED | OUTPATIENT
Start: 2024-09-06 | End: 2024-09-07 | Stop reason: HOSPADM

## 2024-09-06 RX ORDER — HYDROMORPHONE HYDROCHLORIDE 1 MG/ML
0.5 INJECTION, SOLUTION INTRAMUSCULAR; INTRAVENOUS; SUBCUTANEOUS
Status: DISCONTINUED | OUTPATIENT
Start: 2024-09-06 | End: 2024-09-06 | Stop reason: HOSPADM

## 2024-09-06 RX ORDER — MAGNESIUM HYDROXIDE 1200 MG/15ML
LIQUID ORAL AS NEEDED
Status: DISCONTINUED | OUTPATIENT
Start: 2024-09-06 | End: 2024-09-06 | Stop reason: HOSPADM

## 2024-09-06 RX ORDER — DROPERIDOL 2.5 MG/ML
0.62 INJECTION, SOLUTION INTRAMUSCULAR; INTRAVENOUS
Status: COMPLETED | OUTPATIENT
Start: 2024-09-06 | End: 2024-09-06

## 2024-09-06 RX ORDER — FENTANYL CITRATE 50 UG/ML
INJECTION, SOLUTION INTRAMUSCULAR; INTRAVENOUS
Status: DISPENSED
Start: 2024-09-06 | End: 2024-09-07

## 2024-09-06 RX ORDER — OXYCODONE HYDROCHLORIDE 5 MG/1
5 TABLET ORAL EVERY 4 HOURS PRN
Status: DISCONTINUED | OUTPATIENT
Start: 2024-09-06 | End: 2024-09-07 | Stop reason: HOSPADM

## 2024-09-06 RX ORDER — BUPIVACAINE HCL/0.9 % NACL/PF 0.125 %
PLASTIC BAG, INJECTION (ML) EPIDURAL AS NEEDED
Status: DISCONTINUED | OUTPATIENT
Start: 2024-09-06 | End: 2024-09-06 | Stop reason: SURG

## 2024-09-06 RX ORDER — FAMOTIDINE 20 MG/1
20 TABLET, FILM COATED ORAL ONCE
Status: COMPLETED | OUTPATIENT
Start: 2024-09-06 | End: 2024-09-06

## 2024-09-06 RX ORDER — PROMETHAZINE HYDROCHLORIDE 12.5 MG/1
6.25 SUPPOSITORY RECTAL EVERY 6 HOURS PRN
Status: DISCONTINUED | OUTPATIENT
Start: 2024-09-06 | End: 2024-09-07 | Stop reason: HOSPADM

## 2024-09-06 RX ORDER — ACETAMINOPHEN 500 MG
1000 TABLET ORAL EVERY 6 HOURS
Status: DISCONTINUED | OUTPATIENT
Start: 2024-09-06 | End: 2024-09-07 | Stop reason: HOSPADM

## 2024-09-06 RX ORDER — FENTANYL CITRATE 50 UG/ML
INJECTION, SOLUTION INTRAMUSCULAR; INTRAVENOUS
Status: COMPLETED
Start: 2024-09-06 | End: 2024-09-06

## 2024-09-06 RX ORDER — SODIUM CHLORIDE 0.9 % (FLUSH) 0.9 %
10 SYRINGE (ML) INJECTION EVERY 12 HOURS SCHEDULED
Status: DISCONTINUED | OUTPATIENT
Start: 2024-09-06 | End: 2024-09-06 | Stop reason: HOSPADM

## 2024-09-06 RX ORDER — SODIUM CHLORIDE 9 MG/ML
40 INJECTION, SOLUTION INTRAVENOUS AS NEEDED
Status: DISCONTINUED | OUTPATIENT
Start: 2024-09-06 | End: 2024-09-06 | Stop reason: HOSPADM

## 2024-09-06 RX ORDER — DIPHENHYDRAMINE HYDROCHLORIDE 50 MG/ML
12.5 INJECTION INTRAMUSCULAR; INTRAVENOUS EVERY 6 HOURS PRN
Status: DISCONTINUED | OUTPATIENT
Start: 2024-09-06 | End: 2024-09-07 | Stop reason: HOSPADM

## 2024-09-06 RX ORDER — PREGABALIN 75 MG/1
75 CAPSULE ORAL ONCE
Status: COMPLETED | OUTPATIENT
Start: 2024-09-06 | End: 2024-09-06

## 2024-09-06 RX ORDER — PROMETHAZINE HYDROCHLORIDE 12.5 MG/1
6.25 TABLET ORAL EVERY 6 HOURS PRN
Status: DISCONTINUED | OUTPATIENT
Start: 2024-09-06 | End: 2024-09-07 | Stop reason: HOSPADM

## 2024-09-06 RX ADMIN — EPHEDRINE SULFATE 10 MG: 50 INJECTION INTRAVENOUS at 15:50

## 2024-09-06 RX ADMIN — SUGAMMADEX 200 MG: 100 INJECTION, SOLUTION INTRAVENOUS at 14:53

## 2024-09-06 RX ADMIN — BUPIVACAINE HYDROCHLORIDE 60 ML: 2.5 INJECTION, SOLUTION EPIDURAL; INFILTRATION; INTRACAUDAL; PERINEURAL at 11:33

## 2024-09-06 RX ADMIN — SODIUM CHLORIDE 50 ML/HR: 9 INJECTION, SOLUTION INTRAVENOUS at 20:38

## 2024-09-06 RX ADMIN — CEFAZOLIN 2 G: 1 INJECTION, POWDER, FOR SOLUTION INTRAMUSCULAR; INTRAVENOUS at 14:41

## 2024-09-06 RX ADMIN — SODIUM CHLORIDE, POTASSIUM CHLORIDE, SODIUM LACTATE AND CALCIUM CHLORIDE: 600; 310; 30; 20 INJECTION, SOLUTION INTRAVENOUS at 13:39

## 2024-09-06 RX ADMIN — FAMOTIDINE 20 MG: 20 TABLET, FILM COATED ORAL at 10:06

## 2024-09-06 RX ADMIN — OXYCODONE HYDROCHLORIDE 5 MG: 5 TABLET ORAL at 22:45

## 2024-09-06 RX ADMIN — ROCURONIUM BROMIDE 10 MG: 10 INJECTION INTRAVENOUS at 12:51

## 2024-09-06 RX ADMIN — LIDOCAINE HYDROCHLORIDE 50 MG: 10 INJECTION, SOLUTION EPIDURAL; INFILTRATION; INTRACAUDAL; PERINEURAL at 11:24

## 2024-09-06 RX ADMIN — Medication 100 MCG: at 12:37

## 2024-09-06 RX ADMIN — ONDANSETRON 4 MG: 2 INJECTION INTRAMUSCULAR; INTRAVENOUS at 11:40

## 2024-09-06 RX ADMIN — ROCURONIUM BROMIDE 10 MG: 10 INJECTION INTRAVENOUS at 13:26

## 2024-09-06 RX ADMIN — DEXAMETHASONE SODIUM PHOSPHATE 8 MG: 4 INJECTION INTRA-ARTICULAR; INTRALESIONAL; INTRAMUSCULAR; INTRAVENOUS; SOFT TISSUE at 11:33

## 2024-09-06 RX ADMIN — FENTANYL CITRATE 50 MCG: 50 INJECTION, SOLUTION INTRAMUSCULAR; INTRAVENOUS at 12:11

## 2024-09-06 RX ADMIN — SODIUM CHLORIDE, POTASSIUM CHLORIDE, SODIUM LACTATE AND CALCIUM CHLORIDE: 600; 310; 30; 20 INJECTION, SOLUTION INTRAVENOUS at 16:20

## 2024-09-06 RX ADMIN — PROPOFOL 20 MCG/KG/MIN: 10 INJECTION, EMULSION INTRAVENOUS at 11:38

## 2024-09-06 RX ADMIN — LIDOCAINE HYDROCHLORIDE 0.5 ML: 10 INJECTION, SOLUTION EPIDURAL; INFILTRATION; INTRACAUDAL; PERINEURAL at 10:06

## 2024-09-06 RX ADMIN — ACETAMINOPHEN 1000 MG: 500 TABLET ORAL at 22:40

## 2024-09-06 RX ADMIN — Medication 200 MCG: at 15:53

## 2024-09-06 RX ADMIN — EPHEDRINE SULFATE 5 MG: 50 INJECTION INTRAVENOUS at 14:40

## 2024-09-06 RX ADMIN — FENTANYL CITRATE 50 MCG: 50 INJECTION, SOLUTION INTRAMUSCULAR; INTRAVENOUS at 16:51

## 2024-09-06 RX ADMIN — FENTANYL CITRATE 50 MCG: 50 INJECTION, SOLUTION INTRAMUSCULAR; INTRAVENOUS at 17:52

## 2024-09-06 RX ADMIN — Medication 200 MCG: at 15:38

## 2024-09-06 RX ADMIN — TECHNETIUM TC 99M SULFUR COLLOID 1 DOSE: KIT at 12:00

## 2024-09-06 RX ADMIN — FENTANYL CITRATE 50 MCG: 50 INJECTION, SOLUTION INTRAMUSCULAR; INTRAVENOUS at 11:23

## 2024-09-06 RX ADMIN — DROPERIDOL 0.62 MG: 2.5 INJECTION, SOLUTION INTRAMUSCULAR; INTRAVENOUS at 17:49

## 2024-09-06 RX ADMIN — TRANEXAMIC ACID 1000 MG: 10 INJECTION, SOLUTION INTRAVENOUS at 15:22

## 2024-09-06 RX ADMIN — Medication 100 MCG: at 16:11

## 2024-09-06 RX ADMIN — SCOPOLAMINE 1 PATCH: 1.5 PATCH, EXTENDED RELEASE TRANSDERMAL at 10:06

## 2024-09-06 RX ADMIN — CYCLOBENZAPRINE HYDROCHLORIDE 10 MG: 10 TABLET, FILM COATED ORAL at 20:35

## 2024-09-06 RX ADMIN — SUGAMMADEX 200 MG: 100 INJECTION, SOLUTION INTRAVENOUS at 16:21

## 2024-09-06 RX ADMIN — PROPOFOL 200 MG: 10 INJECTION, EMULSION INTRAVENOUS at 11:25

## 2024-09-06 RX ADMIN — DROPERIDOL 0.62 MG: 2.5 INJECTION, SOLUTION INTRAMUSCULAR; INTRAVENOUS at 17:32

## 2024-09-06 RX ADMIN — PROPOFOL 200 MG: 10 INJECTION, EMULSION INTRAVENOUS at 15:25

## 2024-09-06 RX ADMIN — ROCURONIUM BROMIDE 10 MG: 10 INJECTION INTRAVENOUS at 14:10

## 2024-09-06 RX ADMIN — SODIUM CHLORIDE 2000 MG: 900 INJECTION INTRAVENOUS at 11:36

## 2024-09-06 RX ADMIN — DEXAMETHASONE SODIUM PHOSPHATE 4 MG: 10 INJECTION, SOLUTION INTRAMUSCULAR; INTRAVENOUS at 11:33

## 2024-09-06 RX ADMIN — EPHEDRINE SULFATE 5 MG: 50 INJECTION INTRAVENOUS at 13:36

## 2024-09-06 RX ADMIN — EPHEDRINE SULFATE 5 MG: 50 INJECTION INTRAVENOUS at 13:49

## 2024-09-06 RX ADMIN — ROCURONIUM BROMIDE 50 MG: 10 INJECTION INTRAVENOUS at 15:25

## 2024-09-06 RX ADMIN — SODIUM CHLORIDE, POTASSIUM CHLORIDE, SODIUM LACTATE AND CALCIUM CHLORIDE 9 ML/HR: 600; 310; 30; 20 INJECTION, SOLUTION INTRAVENOUS at 10:19

## 2024-09-06 RX ADMIN — PREGABALIN 75 MG: 75 CAPSULE ORAL at 10:06

## 2024-09-06 RX ADMIN — HYDROMORPHONE HYDROCHLORIDE 1 MG: 2 INJECTION, SOLUTION INTRAMUSCULAR; INTRAVENOUS; SUBCUTANEOUS at 15:10

## 2024-09-06 RX ADMIN — ONDANSETRON 4 MG: 2 INJECTION INTRAMUSCULAR; INTRAVENOUS at 20:35

## 2024-09-06 RX ADMIN — ROCURONIUM BROMIDE 100 MG: 10 INJECTION INTRAVENOUS at 11:25

## 2024-09-06 NOTE — ANESTHESIA PROCEDURE NOTES
Peripheral Block      Patient reassessed immediately prior to procedure    Patient location during procedure: OR  Start time: 9/6/2024 11:30 AM  Stop time: 9/6/2024 11:33 AM  Reason for block: at surgeon's request and post-op pain management  Performed by  CRNA/CAA: Yusef España CRNA  Preanesthetic Checklist  Completed: patient identified, IV checked, site marked, risks and benefits discussed, surgical consent, monitors and equipment checked, pre-op evaluation and timeout performed  Prep:  Pt Position: supine  Sterile barriers:cap, gloves, mask and washed/disinfected hands  Prep: ChloraPrep  Patient monitoring: blood pressure monitoring, continuous pulse oximetry and EKG  Procedure  Performed under: general  Guidance:ultrasound guided and landmark technique  Images:still images obtained, printed/placed on chart    Laterality:Bilateral  Block Type:PECS I and PECS II  Injection Technique:single-shot  Needle Type:short-bevel  Needle Gauge:20 G  Resistance on Injection: none    Medications Used: dexamethasone sodium phosphate injection - Injection   4 mg - 9/6/2024 11:33:00 AM  bupivacaine PF (MARCAINE) 0.25 % injection - Injection   60 mL - 9/6/2024 11:33:00 AM      Medications  Preservative Free Saline:10ml  Comment:Block Injection:  Total volume of LA divided between Right and Left sided blocks         Post Assessment  Injection Assessment: negative aspiration for heme, incremental injection and no paresthesia on injection  Patient Tolerance:comfortable throughout block  Complications:no  Additional Notes  Interpectoral-Pectoserratus Plane   A high-frequency linear transducer, with sterile cover, was placed medial to the coracoid process in the paramedian sagittal plane. The transducer was moved caudally to the 4th rib and rotated slightly to allow an in-plane needle trajectory from medial to lateral. Pectoralis Major Muscle (PMM), Pectoralis Minor Muscle (PmM), Thoracoacromial Artery, Ribs, and Pleura were  "identified under ultrasound. The insertion site was prepped in sterile fashion and then localized with 2-5 ml of 1% Lidocaine. Using ultrasound-guidance, a 20-gauge B-Monroe 4\" Ultraplex 360 non-stimulating echogenic needle was advanced in plane until the tip of the needle was in the fascial plane between the PMM and PmM, lateral to the Thoracoacromial Artery. 1-3ml of preservative free normal saline was used to hydro-dissect the fascial planes. After the fascial plane was verified, 10ml local anesthetic (LA) was injected for Interpectoral fascial plane block. The needle was continued along the same path to the level of the 4th rib below PmM.  Initially preservative free normal saline was used to confirm needle position and then 20 ml of LA was injected for Pectoserratus fascial plane block. Aspiration every 5 ml to prevent intravascular injection. Injection was completed with negative aspiration of blood and negative intravascular injection. Injection pressures were normal with minimal resistance.     Performed by: Yusef España, CRNA            "

## 2024-09-06 NOTE — ANESTHESIA PROCEDURE NOTES
Airway  Urgency: elective    Date/Time: 9/6/2024 11:27 AM  Airway not difficult    General Information and Staff    Patient location during procedure: OR  CRNA/CAA: Neil Campuzano CRNA    Indications and Patient Condition  Indications for airway management: airway protection    Preoxygenated: yes  MILS not maintained throughout  Mask difficulty assessment: 1 - vent by mask    Final Airway Details  Final airway type: endotracheal airway      Successful airway: ETT  Cuffed: yes   Successful intubation technique: direct laryngoscopy  Endotracheal tube insertion site: oral  Blade: Chaparro  Blade size: 3  ETT size (mm): 7.0  Cormack-Lehane Classification: grade I - full view of glottis  Placement verified by: chest auscultation and capnometry   Cuff volume (mL): 7  Measured from: lips  ETT/EBT  to lips (cm): 20  Number of attempts at approach: 1  Assessment: lips, teeth, and gum same as pre-op and atraumatic intubation    Additional Comments  Negative epigastric sounds, Breath sound equal bilaterally with symmetric chest rise and fall

## 2024-09-06 NOTE — INTERVAL H&P NOTE
"[facility] Pre-op    Full history and physical note from office is attached.    /87 (BP Location: Right arm, Patient Position: Lying)   Pulse 71   Temp 98.1 °F (36.7 °C) (Temporal)   Resp 16   Ht 160 cm (63\")   Wt 83.9 kg (185 lb)   SpO2 97%   BMI 32.77 kg/m²     Review of Systems:  Constitutional-- No fever, chills or sweats. No fatigue.  CV-- No chest pain, palpitation or syncope  Resp-- No SOB, cough, hemoptysis  Skin--No rashes or lesions    Physical Exam:  Heart:   Regular rate and rhythm, S1 and S2 normal  Lungs: Clear to auscultation bilaterally, respirations unlabored    LAB Results:  Lab Results   Component Value Date    WBC 5.03 08/29/2024    HGB 13.6 08/29/2024    HCT 41.8 08/29/2024    MCV 87.8 08/29/2024     08/29/2024    GLUCOSE 114 (H) 08/29/2024    BUN 12 08/29/2024    CREATININE 0.65 08/29/2024     08/29/2024    K 4.3 08/29/2024     08/29/2024    CO2 25.0 08/29/2024    CALCIUM 9.4 08/29/2024    ALBUMIN 4.3 08/29/2024    AST 25 08/29/2024    ALT 34 (H) 08/29/2024    BILITOT 0.5 08/29/2024 7/29/24 biopsy:  Clinical Information    Abnormal mammogram   Final Diagnosis   LEFT BREAST, 12:00, STEREOTACTIC GUIDED BIOPSY:  In situ carcinoma, intermediate grade, 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  (see comment).  Calcifications present associated with in situ carcinoma.  No invasive carcinoma identified (see comment).  Estrogen receptor and progesterone receptor positive by immunohistochemistry.  Micropapilloma present     Study Result    Narrative & Impression   BILATERAL BREAST MRI        HISTORY: 58-year-old female status post stereotactic core biopsy of  microcalcifications in the left breast at 12:00 pathology consistent  with in situ carcinoma, intermediate grade with focal comedonecrosis  most consistent with ductal carcinoma in situ. Calcifications present  associated with in situ carcinoma. The " patient underwent subsequent  biopsy of another group of calcifications anteriorly in the breast  pathology consistent with sclerosing adenosis with usual ductal  hyperplasia and intraductal papillomas and intraductal calcifications.  Surgical removal of this area was also recommended. On July 29 the  patient underwent a stereotactic core biopsy of an area of architectural  distortion in the right breast pathology consistent with intraductal  papilloma for which surgical removal was recommended. In November 2023  the patient underwent tomographic biopsy of architectural distortion in  the right breast pathology was consistent with intraductal papillomas  with associated microcalcifications. That area has not been removed.  Marking clip is in close proximity to recent biopsy of the right breast  for papillomas. The patient did undergo an excisional biopsy of the left  breast in January 2024 for the previous area in the left breast which  was biopsied demonstrating a radial scar with pathology consistent with  intraductal papillomas, radial sclerosing lesion and adenosis.     Breast MRI to evaluate extent of disease in the left breast and screen  the contralateral right breast     TECHNIQUE:  MRI was performed on a 1.5 Maria De Jesus magnet utilizing an 8  channel [place] breast coil.  Pre-contrast spin-echo T1 weighted and  T2 weighted sequences were obtained in the axial plane.  Routine dynamic  images were performed following the administration of 17 ml of  Multihance contrast.  Four postcontrast runs were obtained. No contrast  complications occurred.  Delayed high resolution post contrast T1  weighted sagittal images were also obtained.  A CAD system ([employer]) was  utilized for data analysis.       COMPARISON: There are no prior MRIs available for comparison. Comparison  is made to multiple screening and diagnostic studies as well as post  procedure mammograms dating from 8/25/2023 to 8/8/2024     FINDINGS: There is  normal vascular enhancement and marked background  enhancement with multiple bilateral areas of enhancement and enhancing  masses. The breast tissue is heterogeneously dense.     On T2 weighted sequences multiple cysts are identified in both breasts.     There is no well-defined area of enhancement separate from the  background enhancement in both breasts corresponding to the marking  clips placed at the time of previous biopsies. Changes consistent with  left excisional biopsy are present.     Given the limitation of background enhancement in the lower inner  quadrant of the right breast is a 1.2 cm area of masslike enhancement  best visualized on axial subtracted image 252, sagittal image 220 on the  DynaCAD software. There is slight washout on kinetic analysis.     Given the limitation of background enhancement in the left breast there  is an 8.6 mm area of masslike enhancement anteriorly at 6:00 in the left  breast best visualized on axial subtracted image 249, sagittal image 58  on the DynaCAD software. This area demonstrates plateau kinetics on  kinetic analysis     In the upper inner quadrant of the left breast a 7.8 mm enhancing mass  demonstrating plateau kinetics on kinetic analysis is best visualized on  axial subtracted image 215, sagittal image 70 on the DynaCAD software.     No axillary adenopathy is visualized.        IMPRESSION:  BI-RADS 4 suspicious abnormality both breasts     RECOMMENDATIONS: 2 site left MRI guided biopsies, 1 site right MRI  guided biopsy. Following the biopsy the patient should undergo surgical  removal for biopsy-proven DCIS and papillomas in the left breast marked  by 2 marking clips as well as biopsy-proven papillomas in the right  breast following tomographic biopsy of the areas of architectural  distortion also marked by 2 marking clips.     BI-RADS CATEGORY: 4 suspicious abnormality both breasts     Cancer Staging (if applicable)  Cancer Patient: __ yes __no  __unknown__N/A; If yes, clinical stage T:__ N:__M:__, stage group or __N/A      Impression: Left breast intraductal carcinoma in situ; ER/SC+      Plan: BILATERAL SKIN SPARING MASTECTOMIES, LEFT SENTINEL LYMPH NODE BIOPSY, POSSIBLE RIGHT SENTINEL LYMPH NODE BIOPSY, POSSIBLE AXILLARY DISSECTION; PLACEMENT OF TISSUE EXPANDERS USING ALLODERM BILATERAL       TAMEKA Elena   9/6/2024   10:01 EDT

## 2024-09-06 NOTE — ANESTHESIA PREPROCEDURE EVALUATION
Anesthesia Evaluation     Patient summary reviewed and Nursing notes reviewed   no history of anesthetic complications:   NPO Solid Status: > 8 hours  NPO Liquid Status: > 8 hours           Airway   Mallampati: II  TM distance: >3 FB  Neck ROM: full  No difficulty expected  Dental      Pulmonary - negative pulmonary ROS and normal exam   Cardiovascular - normal exam    (+) hypertension      Neuro/Psych- negative ROS  GI/Hepatic/Renal/Endo      Musculoskeletal     Abdominal    Substance History      OB/GYN          Other      history of cancer                Anesthesia Plan    ASA 2     general     intravenous induction     Anesthetic plan, risks, benefits, and alternatives have been provided, discussed and informed consent has been obtained with: patient.    Plan discussed with CRNA.    CODE STATUS:

## 2024-09-06 NOTE — ANESTHESIA POSTPROCEDURE EVALUATION
Patient: Rachael Yancey    Procedure Summary       Date: 09/06/24 Room / Location:  [facility]    Anesthesia Start: 1118 Anesthesia Stop: 1641    Procedures:       BILATERAL SKIN SPARING MASTECTOMIES, LEFT SENTINEL LYMPH NODE BIOPSY X 3 (Bilateral: Breast)      PLACEMENT OF TISSUE EXPANDERS USING ALLODERM BILATERAL (Bilateral: Breast) Diagnosis:     Surgeons: Pantera Jorgensen MD; Colton Martin MD Provider: Faustino Schmidt MD    Anesthesia Type: general ASA Status: 2            Anesthesia Type: general    Vitals  Vitals Value Taken Time   BP     Temp     Pulse     Resp     SpO2 95 % 09/06/24 1641           Post Anesthesia Care and Evaluation    Patient location during evaluation: PACU  Patient participation: complete - patient participated  Level of consciousness: awake and alert  Pain management: adequate    Airway patency: patent  Anesthetic complications: No anesthetic complications  PONV Status: none  Cardiovascular status: hemodynamically stable and acceptable  Respiratory status: nonlabored ventilation, acceptable and nasal cannula  Hydration status: acceptable

## 2024-09-06 NOTE — OP NOTE
Plastic Surgery Operative Report        Patient Name:  Rachael Yancey  :  1965  MRN:  [id number]    Date of Surgery:  2024    PREOPERATIVE DIAGNOSIS: Bilateral absent breasts, status post bilateral mastectomies for breast cancer.   POSTOPERATIVE DIAGNOSIS: Bilateral absent breasts, status post bilateral mastectomies for breast cancer.     PROCEDURES PERFORMED:  1. Bilateral breast reconstruction with immediate insertion of 12 cm tissue expanders in a prepectoral position filled to 100 cc.    2. Use of acellular dermal matrix (Cortiva) in breast reconstruction bilaterally.     SURGEON: Colton Martin MD      CPT® Codes: 24594, 01495     Staff:  Surgeon(s):  Colton Martin MD Stokes, Sean M, MD    Circulator: Binh Sharif RN; Alan Luke RN; Melita Ricci RN  Physician Assistant: Omayra Willson PA  Scrub Person: Emerita Orlando; Megan Alejandra; Carey Ocasio RN  Nursing Assistant: Melita Rivera; Mercedes Jean  Assistant: Constantino Valdes PA-C     Assistant: Constantino Valdes PA-C  was responsible for performing the following activities: Retraction, Suction, Irrigation, Suturing, Closing, and Placing Dressing and their skilled assistance was necessary for the success of this case.      Anesthesia: General      Indications for the Procedure:  Rachael Yancey is a 58yoF nonsmoker hx of HTN, Lt breast lumpectomy x2 no radiation with Lt breast DCIS and hx of dense breast. Patient elected for bilateral mastectomies and was found appropriate for immediate breast reconstruction.    Operative Findings:  1. Placement of tissue expanders in a prepectoral position filled to 100 cc.          Description of the Procedure:   Patient was seen in preop holding risks and benefits were discussed with the patient who elected to proceed with the procedure. At this point in time H&P was updated, consent was obtained, and the patient was then marked appropriately (including the breast borders and the mastectomy  incision patterns). The patient was then taken back to the OR placed in a supine position on the operating room table and general anesthesia was induced followed by intubation. The patient was prepped and draped in a sterile like fashion. A time out was performed in which the patient and the procedure were gone over. Everyone was in agreement in terms of the patient and the procedure.      Dr. Jorgensen then started the procedure by performing the bilateral mastectomies and the SLNBs. On the sterile back table the tissue expanders were then prepped and wrapped in alloderm. Tissue expanders were then selected checking expiration date to be sure they were not . The acellular dermal matrixs were also selected checking expiration date to be sure they were not . The air was then taken out of the tissue expanders. Injectable normal saline was then placed into the tissue expanders a total of 100 cc into each tissue expander. The acellular dermal matrix was then prepped in a sterile like fashion. The acellular dermal matrix was then used to wrap the individual tissue expanders suturing together with 3-0 vicryl and tailoring the ends with zavala scissors. Once the tissue expanders were wrapped in acellular dermal matrix they were placed in sterile bowels with antibiotic solution and covered until they were needed during the case.      Following the end of the bilateral mastectomies and SLNBs Dr. Jorgensen had completed the procedures at this point Plastic surgery took over the remainder of the case. The patient's skin was then re prepped with betadine paint. The patient was then re draped in a sterile like fashion. New light handle covers, new bovie and new suction were then placed. Working in both breast pockets. The pockets were then irrigated with warm normal saline. Hemostasis was then checked with bovie cautery taking care not to burn the skin flaps. Once hemostasis was obtained the pockets were irrigated with  antibiotic solution.      The tissue expanders wrapped in acellular dermal matrixs were then placed into the breast pockets in a prepectoral position and the suture tabs were sutured in place with 2-0 silk suture. Top gloves were changed during each handling of the tissue expanders. One 15 Ukrainian drains were placed into each breast pocket. Drains were sutured in place with 3-0 nylon suture. Biopatches were placed around the drains. The mastectomies skin edges were then freshened with a 10 blade. The mastectomies incision was then closed by deep 3-0 monocryls,  deep dermal 3-0 monocryls followed by running 3-0 stratifix suture. The drain sites were then dressed. Drapes were taken down and the patient was placed into a surgical bra with fluffs. At this point in time the procedure had finished.      While moving the patient noticed increase sanguinous drainage from the right breast PETER drain. The drain was stripped multiple times which was noted to be continuous and not stopping or slowing down. The patient was examined noting swelling right upper chest above the tissue expander. At this point in time it was decided to go back to the OR table for right breast exploration. The patient was then re prepped and draped. A new time out was performed confirming the patient and the procedure. The right breast incision was opened up noted hematoma superiorly and laterally. The right breast was washed out. There was a noted arterial bleeder on the superior skin flap. The arterial bleeder was identified and cauterized. The right breast pocket was irrigated and washout out further to check for hemostasis. A new PETER drain was then placed. The skin edges were freshened. Closure was then performed with deep 3-0 monocryls,  deep dermal 3-0 monocryls followed by running 3-0 stratifix suture. Dressing were then placed.     Patient was the extubated and taken to PACU for a full recovery.       Implants:    Implant Name Type Inv. Item  Serial No.  Lot No. LRB No. Used Action   CLIPAPPLR M/ ENDO LIGACLIP 20CLP 11IN MD - [id number] Implant CLIPAPPLR M/ ENDO LIGACLIP 20CLP 11IN MD  [employer]  DIV OF J AND J [id number] N/A 1 Implanted   CLIPAPPLR M/ ENDO LIGACLIP9 3/8IN MD - [id number] Implant CLIPAPPLR M/ ENDO LIGACLIP9 3/8IN MD  [employer]  DIV OF J AND J [id number] N/A 1 Implanted   EXPNDR TISS BRST F/HT XPROJ STL 133S FX/T 450CC - [id number] - [id number] Implant EXPNDR TISS BRST F/HT XPROJ STL 133S FX/T 450CC [id number] [facility] [id number] Left 1 Implanted   EXPNDR TISS BRST F/HT XPROJ STL 133S FX/T 450CC - [id number] - [id number] Implant EXPNDR TISS BRST F/HT XPROJ STL 133S FX/T 450CC [id number] [employer] [id number] Right 1 Implanted   ALLOGRFT DERM CORTIVA SILHOUETTE [id number] - [id number] - [id number] Implant ALLOGRFT DERM CORTIVA SILHOUETTE [id number] [employer] [id number] Left 1 Implanted   ALLOGRFT DERM CORTIVA SILHOUETTE [id number] - [id number] - [id number] Implant ALLOGRFT DERM CORTIVA SILHOUETTE [id number] [employer] [id number] Left 1 Implanted       Specimen Removed: right and left breast tissue      Estimated Blood Loss: 50 cc    Drains Placed: 2 drains total placed, One in each breast    Complications: right breast hematoma following the procedure requiring re prep and washout.       Colton Martin MD     Date: 9/6/2024  Time: 16:49 EDT

## 2024-09-06 NOTE — H&P
Plastic Surgery Consult      Admission Date:  2024  LOS:  0  Patient Care Team:  Kelsy Arthur MD as PCP - General (Obstetrics and Gynecology)    Patient Name:  Rachael Yancey  :  1965  MRN:  [id number]    Date:  2024    History of Present Illness:  Rachael Yancey is a 58yoF nonsmoker hx of HTN, Lt breast lumpectomy x2 no radiation with Lt breast DCIS and hx of dense breast.    Patient states recent stereotactic core biopsy of microcalcifications in the left breast at 12:00 pathology consistent with ductal carcinoma in situ. The patient underwent subsequent biopsy of another group of calcifications anteriorly in the breast pathology consistent with sclerosing adenosis with usual ductal hyperplasia and intraductal papillomas and intraductal calcifications. Patient did undergo an excisional biopsy of the left breast in 2024 for the previous area in the left breast whichwas biopsied demonstrating a radial scar with pathology consistent with intraductal papillomas, radial sclerosing lesion and adenosis.    Patient states hx of dense breast requiring multiple imaging call back and biopsies. Patient is tired of her breast care. Patient recent diagnosis of breast cancer has led to her thinking bilateral mastectomies. Patient saw Dr. Jorgensen who found her appropriate for mastectomies and referred her for reconstruction evaluation. Patient was seen and found appropriate for immediate reconstruction.      Breast Goal: 36 B Cup to C cup    Breast Surgery: Lt breast lumpectomy x2    Breast pathology  Final Diagnosis  LEFT BREAST, GROUPED CALCIFICATIONS, STEREOTACTIC BIOPSY:  Sclerosing adenosis with usual ductal hyperplasia (UDH), fibrocystic changes, intraductal  papillomas and intraductal calcifications  Negative for atypical hyperplasia or carcinoma    LEFT BREAST, 12:00, STEREOTACTIC GUIDED BIOPSY:  In situ carcinoma, intermediate grade, solid pattern with focal comedonecrosis most consistent with  ductal carcinoma in  situ. (see comment).  Calcifications present associated with in situ carcinoma.  No invasive carcinoma identified (see comment).  Estrogen receptor and progesterone receptor positive by immunohistochemistry.  Micropapilloma present    Final Diagnosis  RIGHT BREAST, 9:00, STEREOTACTIC GUIDED BIOPSY:  Intraductal papilloma.  Fibrocystic change with fibrosis, adenosis and focal papillary apocrine change.  Calcifications present associated with fibrocystic change.  No atypia identified.    PMH: HTN  PSH: CCY, Hysterectomy, Partial knee replacement Right    SH: denies smoking, drinking, or illicit drug abuse. Lives in [facility]. Patient retired.    Allergies: NKDA  Medications: No blood thinners or herbal supplements.        History:   Past Medical History:   Diagnosis Date    Cancer     Hypertension      Past Surgical History:   Procedure Laterality Date    BREAST BIOPSY Left     CHOLECYSTECTOMY      COLONOSCOPY      HYSTERECTOMY      2008    KNEE ARTHROPLASTY Right      History reviewed. No pertinent family history.  Social History     Socioeconomic History    Marital status:    Tobacco Use    Smoking status: Never    Smokeless tobacco: Never   Vaping Use    Vaping status: Never Used   Substance and Sexual Activity    Alcohol use: Not Currently    Drug use: Not Currently     No Known Allergies    Medication:    Current Facility-Administered Medications:     ceFAZolin 2000 mg IVPB in 100 mL NS (MBP), 2,000 mg, Intravenous, Once, Pantera Jorgensen MD    lactated ringers infusion, 9 mL/hr, Intravenous, Continuous, Sudheer Ortega MD, Last Rate: 9 mL/hr at 09/06/24 1019, 9 mL/hr at 09/06/24 1019    midazolam (VERSED) injection 1 mg, 1 mg, Intravenous, Q10 Min PRN, Sudheer Ortega MD    scopolamine patch 1 mg/72 hr, 1 patch, Transdermal, Once, Pantera Jorgensen MD, 1 patch at 09/06/24 1006    sodium chloride 0.9 % flush 10 mL, 10 mL, Intravenous, Q12H, Sudheer Ortega MD    sodium chloride  0.9 % flush 10 mL, 10 mL, Intravenous, PRN, Sudheer Ortega MD    sodium chloride 0.9 % infusion 40 mL, 40 mL, Intravenous, PRN, Sudheer Ortega MD    Antibiotics:  Anti-Infectives (From admission, onward)      Ordered     Dose/Rate Route Frequency Start Stop    24 0945  ceFAZolin 2000 mg IVPB in 100 mL NS (MBP)        Ordering Provider: Pantera Jorgensen MD    2,000 mg  over 30 Minutes Intravenous Once 24 0947              No Known Allergies       Review of Systems:  Constitutional-- No Fever, chills or sweats.  Appetite okay, and no malaise. No fatigue.  HEENT-- No new vision, hearing or throat complaints.  No epistaxis or oral sores.  Denies odynophagia or dysphagia. No headache, photophobia or neck stiffness.  CV-- No chest pain, palpitation or syncope  Resp-- No SOB/cough/Hemoptysis  GI- No nausea, vomiting, or diarrhea.  No hematochezia, melena, or hematemesis. Denies jaundice or chronic liver disease.  -- No dysuria, hematuria, or flank pain.  Denies hesitancy, urgency or flank pain.  Lymph- no swollen lymph nodes in neck/axilla or groin.   Heme- No active bruising or bleeding; no Hx of DVT or PE.  MS-- no swelling or pain in the bones or joints of arms/legs.  No new back pain.  He does not verbalize pain per sister at bedside.  Neuro-- No acute focal weakness or numbness in the arms or legs.  No seizures.  Skin--No rashes.       Physical Exam:   Vital Signs:  Temp (24hrs), Av.1 °F (36.7 °C), Min:98.1 °F (36.7 °C), Max:98.1 °F (36.7 °C)    Temp  Min: 98.1 °F (36.7 °C)  Max: 98.1 °F (36.7 °C)  BP  Min: 116/87  Max: 116/87  Pulse  Min: 71  Max: 71  Resp  Min: 16  Max: 16  SpO2  Min: 97 %  Max: 97 %    General: Well developed, well nourished, alert and cooperative, and appears to be in no acute distress.  Head: normocephalic.  HEENT: Normocephalic, atraumatic.  EOMI. No conjunctival injection. No icterus. No nasal discharge.  Heart: Rhythm is regular. There is no peripheral edema, cyanosis or  "pallor. Extremities are warm and well perfused. Capillary refill is less than 2 seconds.  Lungs: Clear to auscultation bilaterally, Normal respiratory effort. Nonlabored. No dullness.  Abdomen: Soft, nontender, nondistended. No rebound or guarding. NO mass or HSM.      Focused Breast Exam: bilateral breast II to III breast ptosis, Large areola, Lt lumpectomy scar lateral areola close in volume and symmetry    Rt breast:  BW: 13 cm  SN-N: 24 cm  N-IMF: 7 cm    Lt breast:  BW: 13 cm  SN-N: 24 cm  N-IMF: 7 cm      Laboratory Data:                                  Estimated Creatinine Clearance: 94.4 mL/min (by C-G formula based on SCr of 0.65 mg/dL).                 Microbiology:  No results found for: \"ACANTHNAEG\", \"AFBCX\", \"BPERTUSSISCX\", \"BLOODCX\"  No results found for: \"BCIDPCR\", \"CXREFLEX\", \"CSFCX\", \"CULTURETIS\"  No results found for: \"CULTURES\", \"HSVCX\", \"URCX\"  No results found for: \"EYECULTURE\", \"GCCX\", \"HSVCULTURE\", \"LABHSV\"  No results found for: \"LEGIONELLA\", \"MRSACX\", \"MUMPSCX\", \"MYCOPLASCX\"  No results found for: \"NOCARDIACX\", \"STOOLCX\"  No results found for: \"THROATCX\", \"UNSTIMCULT\", \"URINECX\", \"CULTURE\", \"VZVCULTUR\"  No results found for: \"VIRALCULTU\", \"WOUNDCX\"        Assessment: 58yoF nonsmoker hx of HTN, Lt breast lumpectomy x2 no radiation with Lt breast DCIS and hx of dense breast.    Plan:  Discussed risks and benefits of implant-based reconstruction vs autologous breast reconstruction vs prosthetics. Discussed SSM vs NSM. Discussed delayed vs immediate reconstruction. Based on PE and hx patient recommend bilateral SSM by Dr. Jorgensen followed by immediate reconstruction starting with TE and alloderm.      Reviewed medical records  Reviewed breast pathology  Reviewed imaging  Obtained pictures with photo consent  Showed pictures and diagrams explaining surgery  Discussed case with Jameel.    To OR bilateral SSM by Dr. Jorgensen followed by immediate reconstruction starting with tissue expanders and " alloderm.              Colton Martin MD  09/06/24  10:24 EDT

## 2024-09-06 NOTE — OP NOTE
OPERATIVE NOTE    Patient Name:  Rachael Yancey  YOB: 1965  [id number]     Date of Surgery:  9/6/2024     Pre-op Diagnosis: Left breast DCIS    Post-op Diagnosis: Left breast DCIS      Procedure:   Left skin-sparing mastectomy  Left axillary sentinel lymph node biopsy  Intraoperative lymphatic mapping with methylene blue and radioactive dye injection  Right prophylactic skin-sparing mastectomy  Placement of bilateral tissue expanders (Planned, Dr. Martin)    Surgeon:   MD Colton Bland MD    Assistant: Omayra Willson PA-C Assistant: Constantino Valdes PA-C was responsible for performing the following activities: Retraction, Suction, Irrigation, Suturing, Closing, and Placing Dressing and their skilled assistance was necessary for the success of this case.      Anesthesia: General    Cascade Node Biopsy for Breast Cancer - Left  Operation performed with curative intent. Yes   Tracer(s) used to identify sentinel nodes in the upfront surgery (non-neoadjuvant) setting (select all that apply). Dye and Radioactive tracer   Tracer(s) used to identify sentinel nodes in the neoadjuvant setting (select all that apply). N/A   All nodes (colored or non-colored) present at the end of a dye-filled lymphatic channel were removed. Yes   All significantly radioactive nodes were removed. Yes   All palpably suspicious nodes were removed. N/A   Biopsy-proven positive nodes marked with clips prior to chemotherapy were identified and removed. N/A        Estimated Blood Loss: minimal    Complications: None apparent    Implants:    Implant Name Type Inv. Item Serial No.  Lot No. LRB No. Used Action   CLIPAPPLR M/ ENDO LIGACLIP 20CLP 11IN MD - [id number] Implant CLIPAPPLR M/ ENDO LIGACLIP 20CLP 11IN MD  [employer]  DIV OF J AND J [id number] N/A 1 Implanted   CLIPAPPLR M/ ENDO LIGACLIP9 3/8IN MD - [id number] Implant CLIPAPPLR M/ ENDO LIGACLIP9 3/8IN MD  [employer]  DIV OF J AND J [id number]  N/A 1 Implanted       Specimen:          A. Right mastectomy - short stitch superior long stitch lateral  B. Left mastectomy - short stitch superior, long stitch lateral  C. Left axillary sentinel lymph node #1  D. Left axillary sentinel lymph node #2  E. Left axillary sentinel lymph node #3    Findings:   Dense breast tissues bilaterally. No gross evidence of disease. Three sentinel lymph nodes retrieved.    Indications:  Mrs. Rachael Yancey is a 59 year old woman with left breast DCIS at 12:00. Due to her dense breasts and multiple architectural abnormalities on screening imaging, she has had to undergo repeated breast biopsies with short interval follow-up. I have discussed with her the surgical options for treating DCIS which includes mastectomy and BCT. She prefers to undergo left mastectomy and contralateral right mastectomy with reconstruction given her high risk.     The risks and benefits of the procedure were discussed including, but not limited to: bleeding, infection, injury to adjacent structures, need for re-intervention (operative intervention, drain placement, etc.), and medical complications due to the patient's underlying co-morbidities and the risks of general anesthesia. The risks of hematoma, need for additional oncologic surgery, and complications related to reconstruction were reviewed which are specific to this procedure.     The patient wishes to proceed and consented for surgery. All of the patient's questions were answered.     Description of Procedure:   After informed consent was obtained, patient identification verified, and pre operative checklist completed, the patient was brought to the Operating Room and placed comfortably in the supine position on the operating table.  The patient was given appropriate antimicrobial prophylaxis.    General anesthesia was administered without complication and the patient was intubated without difficulty. The left breast was injected sub-dermal and  sub-areolar with both isosulfan blue dye and radioactive tracer for lymphatic mapping. A Hackett catheter was placed.  The patient’s chest and upper abdomen was prepped and draped in the usual sterile fashion. The patient's anatomic landmarks for reconstruction had been marked pre-operatively by Dr. Martin. After an appropriate surgical pause and time out was completed, my attention was turned to the right breast. An incision was made along the pre-marked skin-sparing elliptical markings around the right nipple. Dissection was carried through the dermis. Flaps were raised superiorly to the clavicle, medially to the lateral border of the sternum, inferiorly to the level of the infra-mammary fold, and laterally to the latissimus dorsi muscle. The breast was then removed from the pectoralis fascia. Perforating medium-sized blood vessels were clipped or cauterized as necessary. The specimen was marked short stitch superior and long stitch lateral and sent for permanent pathology. The wound was irrigated thoroughly with saline. Hemostasis was achieved. A wet laparotomy pad was placed in the wound bed and it was covered with a blue towel.     Attention was then turned to the left side which contained the known malignancy. The left breast was resected in a similar fashion as above. It was marked in the same fashion. Attention was then turned to the left axilla. The clavipectoral fascia was incised to enter the left axillary depths. The neoprobe was used to assess radioactivity. Three separate lymph nodes were identified and excised. This was accomplished with careful dissection and clipping of vessels and lymphatics. These were sent for permanent. The axilla had no further blue or radioactive lymph nodes. The wound was irrigated thoroughly with saline. Hemostasis was achieved.     With the resection complete, Dr. Martin was notified. Please refer to his operative report for details regarding subsequent reconstruction.    All  sponge and needle counts were correct times two at the completion my procedure.     Pantera Jorgensen MD     Date: 9/6/2024  Time: 15:10 EDT    .[facility]

## 2024-09-07 VITALS
SYSTOLIC BLOOD PRESSURE: 102 MMHG | TEMPERATURE: 98 F | HEIGHT: 63 IN | DIASTOLIC BLOOD PRESSURE: 61 MMHG | WEIGHT: 185 LBS | OXYGEN SATURATION: 96 % | HEART RATE: 60 BPM | BODY MASS INDEX: 32.78 KG/M2 | RESPIRATION RATE: 18 BRPM

## 2024-09-07 RX ADMIN — OXYCODONE HYDROCHLORIDE 5 MG: 5 TABLET ORAL at 05:37

## 2024-09-07 RX ADMIN — ACETAMINOPHEN 1000 MG: 500 TABLET ORAL at 09:56

## 2024-09-07 RX ADMIN — CYCLOBENZAPRINE HYDROCHLORIDE 10 MG: 10 TABLET, FILM COATED ORAL at 08:44

## 2024-09-07 RX ADMIN — ACETAMINOPHEN 1000 MG: 500 TABLET ORAL at 04:10

## 2024-09-07 NOTE — DISCHARGE INSTR - ACTIVITY
Keep walking. Wear abdominal binder for 2-3 days. May shower. See inserts for drain care instructions.

## 2024-09-07 NOTE — PLAN OF CARE
Goal Outcome Evaluation:  Plan of Care Reviewed With: patient        Progress: improving  Outcome Evaluation: Pt arrived to floor from PACU at 1900. Ambulated from stretcher to bathroom and to bed. Dizziness and significant nausea noted. PRN Zofran given x1. Bilateral breast incisions C/D/I. PETER drains in place and patent. 30 ml from R drain and 10 ml from L drain. Output serosanguinous bilaterally. R hematoma site remains soft. No new bruising noted. Ace wrap remains in place. Ambulating in halls and room with x1 assist. IV fluids infusing. PO fluids encouraged. Poor PO intake r/t nausea. Pain managed with PRN flexeril and andrea. Pt reports most significant pain as a sharp pain at R PETER site with movement. Site dry, intact, soft, and non-reddened. Sleeping well between care. Spouse at bedside, attentive to pt, and participating in care. Pt hopeful to d/c home this AM.

## 2024-09-07 NOTE — DISCHARGE SUMMARY
History of Present Illness:  Rachael Yancey is a 58yoF nonsmoker hx of HTN, Lt breast lumpectomy x2 no radiation with Lt breast DCIS and hx of dense breast.     Patient states recent stereotactic core biopsy of microcalcifications in the left breast at 12:00 pathology consistent with ductal carcinoma in situ. The patient underwent subsequent biopsy of another group of calcifications anteriorly in the breast pathology consistent with sclerosing adenosis with usual ductal hyperplasia and intraductal papillomas and intraductal calcifications. Patient did undergo an excisional biopsy of the left breast in January 2024 for the previous area in the left breast whichwas biopsied demonstrating a radial scar with pathology consistent with intraductal papillomas, radial sclerosing lesion and adenosis.     Patient states hx of dense breast requiring multiple imaging call back and biopsies. Patient is tired of her breast care. Patient recent diagnosis of breast cancer has led to her thinking bilateral mastectomies. Patient saw Dr. Jorgensen who found her appropriate for mastectomies and referred her for reconstruction evaluation. Patient was seen and found appropriate for immediate reconstruction.          OR   Date of Surgery:  9/6/2024     Pre-op Diagnosis: Left breast DCIS     Post-op Diagnosis: Left breast DCIS       Procedure:   Left skin-sparing mastectomy  Left axillary sentinel lymph node biopsy  Intraoperative lymphatic mapping with methylene blue and radioactive dye injection  Right prophylactic skin-sparing mastectomy  Placement of bilateral tissue expanders (Planned, Dr. Martin)    Date of Surgery:  9/6/2024     PREOPERATIVE DIAGNOSIS: Bilateral absent breasts, status post bilateral mastectomies for breast cancer.   POSTOPERATIVE DIAGNOSIS: Bilateral absent breasts, status post bilateral mastectomies for breast cancer.      PROCEDURES PERFORMED:  1. Bilateral breast reconstruction with immediate insertion of 12 cm tissue  expanders in a prepectoral position filled to 100 cc.    2. Use of acellular dermal matrix (Cortiva) in breast reconstruction bilaterally.     Complications: right breast hematoma following the procedure requiring re prep and washout.     While moving the patient noticed increase sanguinous drainage from the right breast PETER drain. The drain was stripped multiple times which was noted to be continuous and not stopping or slowing down. The patient was examined noting swelling right upper chest above the tissue expander. At this point in time it was decided to go back to the OR table for right breast exploration. The patient was then re prepped and draped. A new time out was performed confirming the patient and the procedure. The right breast incision was opened up noted hematoma superiorly and laterally. The right breast was washed out. There was a noted arterial bleeder on the superior skin flap. The arterial bleeder was identified and cauterized. The right breast pocket was irrigated and washout out further to check for hemostasis. A new PETER drain was then placed. The skin edges were freshened. Closure was then performed with deep 3-0 monocryls,  deep dermal 3-0 monocryls followed by running 3-0 stratifix suture. Dressing were then placed.      Patient was the extubated and taken to PACU for a full recovery.       Physical Exam  Gen: NAD, alert and oriented  Breast: incision C/D/I close in symmetry and volume, no ecchymosis, soft no palpable hematoma, TE in place, Drains in place minimal output      Hospital Course  Patient was a planned admit following her surgery which was complicated by right breast hematoma requiring washout. Patient did well following the hematoma. Patient was seen in AM. Patient was alert and oriented sitting up in bed. Patient was voiding, no nausea, pain controlled and ambulating. Patient was found appropriate for discharge.

## 2024-09-07 NOTE — PLAN OF CARE
Goal Outcome Evaluation:  Plan of Care Reviewed With: patient, spouse        Progress: improving  Outcome Evaluation: VSS. RA. Patient discharged today at 10:00am. PRN flexeril given, scheduled tylenol given. PETER drain care instruction provided. Minimal output this am. Patient riding home with spouse. All scripts at home per Dr. Martin. All necessary supplies given. No further needs identified at this time.

## 2024-09-10 ENCOUNTER — HOSPITAL ENCOUNTER (OUTPATIENT)
Dept: MAMMOGRAPHY | Facility: HOSPITAL | Age: 59
Discharge: HOME | End: 2024-09-10
Payer: COMMERCIAL

## 2024-09-10 DIAGNOSIS — R92.8 ABNORMAL RADIOGRAPHIC FINDINGS IN SPECIMEN FROM FEMALE BREAST: ICD-10-CM

## 2024-09-10 PROCEDURE — 76098 X-RAY EXAM SURGICAL SPECIMEN: CPT | Performed by: RADIOLOGY

## 2024-09-10 PROCEDURE — 76098 X-RAY EXAM SURGICAL SPECIMEN: CPT

## 2024-09-12 ENCOUNTER — PATIENT OUTREACH (OUTPATIENT)
Dept: ONCOLOGY | Facility: CLINIC | Age: 59
End: 2024-09-12
Payer: COMMERCIAL

## 2024-09-12 NOTE — SIGNIFICANT NOTE
Patient called to get result of SLN from her bilateral mastectomies 9/6/2024 and a f/u appointment with Dr Jorgensen- I reported to patient that her SLN was negative - 3 checked in total  I called Dr Jorgensen office and spoke to Viktoria who will call patient with f/u appointment date and time- patient states she is doing well - she saw Dr Colton Martin today and will see him on Tuesday 9/17/2024.

## 2025-03-14 ENCOUNTER — TRANSCRIBE ORDERS (OUTPATIENT)
Dept: ADMINISTRATIVE | Facility: HOSPITAL | Age: 60
End: 2025-03-14
Payer: COMMERCIAL

## 2025-03-14 DIAGNOSIS — K43.2 INCISIONAL HERNIA WITHOUT OBSTRUCTION OR GANGRENE: Primary | ICD-10-CM

## 2025-04-01 ENCOUNTER — HOSPITAL ENCOUNTER (OUTPATIENT)
Facility: HOSPITAL | Age: 60
Discharge: HOME | End: 2025-04-01
Admitting: STUDENT IN AN ORGANIZED HEALTH CARE EDUCATION/TRAINING PROGRAM
Payer: COMMERCIAL

## 2025-04-01 DIAGNOSIS — K43.2 INCISIONAL HERNIA WITHOUT OBSTRUCTION OR GANGRENE: ICD-10-CM

## 2025-04-01 PROCEDURE — 74176 CT ABD & PELVIS W/O CONTRAST: CPT

## 2025-04-01 PROCEDURE — 74176 CT ABD & PELVIS W/O CONTRAST: CPT | Performed by: RADIOLOGY

## (undated) DEVICE — SYRINGE, LUER LOCK, 60ML: Brand: MEDLINE

## (undated) DEVICE — DRAIN JACKSON PRATT ROUND 15FR: Brand: CARDINAL HEALTH

## (undated) DEVICE — UNDRGLV SURG BIOGEL PUNCTUREINDICATION SZ7 PF STRL

## (undated) DEVICE — BOWL UTIL STRL 32OZ

## (undated) DEVICE — ANTIBACTERIAL UNDYED BRAIDED (POLYGLACTIN 910), SYNTHETIC ABSORBABLE SUTURE: Brand: COATED VICRYL

## (undated) DEVICE — SUT SILK 2/0 SH CR8 18IN CR8 C012D

## (undated) DEVICE — SUT ETHLN 2/0 PS 18IN 585H

## (undated) DEVICE — DISH PETRI 3.5IN MD STRL LF

## (undated) DEVICE — LEX GENERAL BREAST: Brand: MEDLINE INDUSTRIES, INC.

## (undated) DEVICE — SYS CLS SKIN PREMIERPRO EXOFINFUSION 22CM

## (undated) DEVICE — GLV SURG SENSICARE PI MIC PF SZ7.5 LF STRL

## (undated) DEVICE — 3M™ STERI-STRIP™ REINFORCED ADHESIVE SKIN CLOSURES, R1547, 1/2 IN X 4 IN (12 MM X 100 MM), 6 STRIPS/ENVELOPE: Brand: 3M™ STERI-STRIP™

## (undated) DEVICE — GLV SURG BIOGEL LTX PF 7

## (undated) DEVICE — BNDR ABD PREMIUM/UNIV 10IN 27TO48IN

## (undated) DEVICE — BLAKE SILICONE DRAIN, 15 FR ROUND, HUBLESS WITH 3/16" TROCAR: Brand: BLAKE

## (undated) DEVICE — SYRINGE, LUER LOCK, 5ML: Brand: MEDLINE

## (undated) DEVICE — SUT MNCRYL 3/0 SH 27IN UD MCP416H

## (undated) DEVICE — 3M™ IOBAN™ 2 ANTIMICROBIAL INCISE DRAPE 6650EZ: Brand: IOBAN™ 2

## (undated) DEVICE — APPL CHLORAPREP TINTED 26ML TEAL

## (undated) DEVICE — UNDERGLV SURG BIOGEL INDICAT PI SZ8 BLU

## (undated) DEVICE — HDRST PAD EA/20PC

## (undated) DEVICE — INTENDED FOR TISSUE SEPARATION, AND OTHER PROCEDURES THAT REQUIRE A SHARP SURGICAL BLADE TO PUNCTURE OR CUT.: Brand: BARD-PARKER ® STAINLESS STEEL BLADES

## (undated) DEVICE — PK CHST BRST 10

## (undated) DEVICE — BIOPATCH™ ANTIMICROBIAL DRESSING WITH CHLORHEXIDINE GLUCONATE IS A HYDROPHILLIC POLYURETHANE ABSORPTIVE FOAM WITH CHLORHEXIDINE GLUCONATE (CHG) WHICH INHIBITS BACTERIAL GROWTH UNDER THE DRESSING. THE DRESSING IS INTENDED TO BE USED TO ABSORB EXUDATE, COVER A WOUND CAUSED BY VASCULAR AND NONVASCULAR PERCUTANEOUS MEDICAL DEVICES DURING SURGERY, AS WELL AS REDUCE LOCAL INFECTION AND COLONIZATION OF MICROORGANISMS.: Brand: BIOPATCH

## (undated) DEVICE — ADHS SKIN PREMIERPRO EXOFIN TOPICAL HI/VISC .5ML

## (undated) DEVICE — DRSNG PAD ABD 8X10IN STRL

## (undated) DEVICE — BLANKT WARM LOWR/BDY 100X120CM

## (undated) DEVICE — DRAPE,UTILITY,TAPE,15X26,STERILE: Brand: MEDLINE

## (undated) DEVICE — ELECTRD BLD EZ CLN MOD XLNG 2.75IN

## (undated) DEVICE — SUT ETHLN 3/0 PC5 18IN 1893G

## (undated) DEVICE — PREMIUM DRY TRAY LF: Brand: MEDLINE INDUSTRIES, INC.

## (undated) DEVICE — CLN MEGADYNE TP SS

## (undated) DEVICE — SOL PVPI 10PCT 0.75OZ PEEL/PCH/PACKET 1P/U STRL

## (undated) DEVICE — SPNG LAP PREWSH SFTPK 18X18IN STRL PK/5

## (undated) DEVICE — HDRST POSTIN FM CRDL TRACH SLOT NONCOMRESS 9X8X4IN

## (undated) DEVICE — SPNG GZ WOVN 4X4IN 12PLY 10/BX STRL

## (undated) DEVICE — TRAP FLD MINIVAC MEGADYNE 100ML

## (undated) DEVICE — BLANKT WARM UPPR/BDY ARM/OUT 57X196CM

## (undated) DEVICE — SUT SILK 3/0 TIES 18IN A184H

## (undated) DEVICE — STERILE PVP: Brand: MEDLINE INDUSTRIES, INC.

## (undated) DEVICE — BANDAGE,GAUZE,BULKEE II,4.5"X4.1YD,STRL: Brand: MEDLINE

## (undated) DEVICE — BRA COMPR SURG STL958 LG

## (undated) DEVICE — GOWN,NON-REINFORCED,SIRUS,SET IN SLV,XL: Brand: MEDLINE

## (undated) DEVICE — PCH INST SURG INVISISHIELD 2PCKT

## (undated) DEVICE — DRSNG SURESITE WNDW 2.38X2.75

## (undated) DEVICE — SUT MNCRYL PLS ANTIB UD 4/0 PS2 18IN

## (undated) DEVICE — TOWEL,OR,DSP,ST,BLUE,STD,4/PK,20PK/CS: Brand: MEDLINE

## (undated) DEVICE — DRSNG WND BORDR/ADHS NONADHR/GZ LF 2X2IN STRL

## (undated) DEVICE — JACKSON-PRATT 100CC BULB RESERVOIR: Brand: CARDINAL HEALTH

## (undated) DEVICE — PATIENT RETURN ELECTRODE, SINGLE-USE, CONTACT QUALITY MONITORING, ADULT, WITH 9FT CORD, FOR PATIENTS WEIGING OVER 33LBS. (15KG): Brand: MEGADYNE

## (undated) DEVICE — BNDG,ELSTC,MATRIX,STRL,6"X5YD,LF,HOOK&LP: Brand: MEDLINE

## (undated) DEVICE — SUT MNCRYL PLS ANTIB UD 3/0 PS2 27IN

## (undated) DEVICE — CVR HNDL LIGHT RIGID

## (undated) DEVICE — BNDG ELAS W/CLIP 6IN 10YD LF STRL

## (undated) DEVICE — SURGUARD3 SAFETY HYPODERMIC NEEDLE: Brand: SURGUARD3

## (undated) DEVICE — TOTAL TRAY, 16FR 10ML SIL FOLEY, URN: Brand: MEDLINE

## (undated) DEVICE — TP PLSTC CLR TRNSPORE 1IN SURG